# Patient Record
Sex: FEMALE | Race: OTHER | Employment: FULL TIME | URBAN - NONMETROPOLITAN AREA
[De-identification: names, ages, dates, MRNs, and addresses within clinical notes are randomized per-mention and may not be internally consistent; named-entity substitution may affect disease eponyms.]

---

## 2017-11-24 PROBLEM — Z00.00 ANNUAL PHYSICAL EXAM: Status: ACTIVE | Noted: 2017-11-24

## 2017-11-24 PROBLEM — I10 ESSENTIAL HYPERTENSION: Status: ACTIVE | Noted: 2017-11-24

## 2017-11-24 PROBLEM — E78.00 PURE HYPERCHOLESTEROLEMIA: Status: ACTIVE | Noted: 2017-11-24

## 2017-12-05 DIAGNOSIS — I10 ESSENTIAL HYPERTENSION: Primary | ICD-10-CM

## 2017-12-05 DIAGNOSIS — Z00.00 ANNUAL PHYSICAL EXAM: ICD-10-CM

## 2017-12-05 DIAGNOSIS — E78.00 PURE HYPERCHOLESTEROLEMIA: ICD-10-CM

## 2017-12-26 DIAGNOSIS — E78.00 PURE HYPERCHOLESTEROLEMIA: ICD-10-CM

## 2017-12-26 DIAGNOSIS — I10 ESSENTIAL HYPERTENSION: ICD-10-CM

## 2017-12-26 DIAGNOSIS — Z00.00 ANNUAL PHYSICAL EXAM: ICD-10-CM

## 2017-12-26 LAB
ALBUMIN SERPL-MCNC: 4.1 G/DL (ref 3.5–5.2)
ALP BLD-CCNC: 63 U/L (ref 35–104)
ALT SERPL-CCNC: 13 U/L (ref 5–33)
ANION GAP SERPL CALCULATED.3IONS-SCNC: 14 MMOL/L (ref 7–19)
AST SERPL-CCNC: 15 U/L (ref 5–32)
BACTERIA: ABNORMAL /HPF
BILIRUB SERPL-MCNC: 0.5 MG/DL (ref 0.2–1.2)
BILIRUBIN URINE: NEGATIVE
BLOOD, URINE: NEGATIVE
BUN BLDV-MCNC: 12 MG/DL (ref 6–20)
CALCIUM SERPL-MCNC: 8.8 MG/DL (ref 8.6–10)
CHLORIDE BLD-SCNC: 103 MMOL/L (ref 98–111)
CHOLESTEROL, TOTAL: 193 MG/DL (ref 160–199)
CLARITY: ABNORMAL
CO2: 25 MMOL/L (ref 22–29)
COLOR: YELLOW
CREAT SERPL-MCNC: 0.8 MG/DL (ref 0.5–0.9)
EPITHELIAL CELLS, UA: 6 /HPF (ref 0–5)
GFR NON-AFRICAN AMERICAN: >60
GLUCOSE BLD-MCNC: 91 MG/DL (ref 74–109)
GLUCOSE URINE: NEGATIVE MG/DL
HCT VFR BLD CALC: 40.9 % (ref 37–47)
HDLC SERPL-MCNC: 67 MG/DL (ref 65–121)
HEMOGLOBIN: 13.6 G/DL (ref 12–16)
HYALINE CASTS: 0 /HPF (ref 0–8)
KETONES, URINE: NEGATIVE MG/DL
LDL CHOLESTEROL CALCULATED: 108 MG/DL
LEUKOCYTE ESTERASE, URINE: ABNORMAL
MCH RBC QN AUTO: 29.5 PG (ref 27–31)
MCHC RBC AUTO-ENTMCNC: 33.3 G/DL (ref 33–37)
MCV RBC AUTO: 88.7 FL (ref 81–99)
NITRITE, URINE: NEGATIVE
PDW BLD-RTO: 11.9 % (ref 11.5–14.5)
PH UA: 7
PLATELET # BLD: 250 K/UL (ref 130–400)
PMV BLD AUTO: 9.8 FL (ref 9.4–12.3)
POTASSIUM SERPL-SCNC: 4.1 MMOL/L (ref 3.5–5)
PROTEIN UA: NEGATIVE MG/DL
RBC # BLD: 4.61 M/UL (ref 4.2–5.4)
RBC UA: 1 /HPF (ref 0–4)
SODIUM BLD-SCNC: 142 MMOL/L (ref 136–145)
SPECIFIC GRAVITY UA: 1.02
TOTAL PROTEIN: 7 G/DL (ref 6.6–8.7)
TRIGL SERPL-MCNC: 92 MG/DL (ref 0–149)
TSH SERPL DL<=0.05 MIU/L-ACNC: 1.12 UIU/ML (ref 0.27–4.2)
UROBILINOGEN, URINE: 0.2 E.U./DL
WBC # BLD: 7.5 K/UL (ref 4.8–10.8)
WBC UA: 6 /HPF (ref 0–5)

## 2017-12-28 LAB — URINE CULTURE, ROUTINE: NORMAL

## 2017-12-28 RX ORDER — METHYLDOPA 500 MG/1
1 TABLET, FILM COATED ORAL 2 TIMES DAILY
COMMUNITY
Start: 2017-12-21 | End: 2018-01-02 | Stop reason: SDUPTHER

## 2018-01-02 ENCOUNTER — OFFICE VISIT (OUTPATIENT)
Dept: INTERNAL MEDICINE | Age: 43
End: 2018-01-02
Payer: COMMERCIAL

## 2018-01-02 VITALS
OXYGEN SATURATION: 99 % | HEART RATE: 77 BPM | DIASTOLIC BLOOD PRESSURE: 100 MMHG | HEIGHT: 66 IN | SYSTOLIC BLOOD PRESSURE: 148 MMHG | WEIGHT: 215.4 LBS | BODY MASS INDEX: 34.62 KG/M2

## 2018-01-02 DIAGNOSIS — E78.00 PURE HYPERCHOLESTEROLEMIA: ICD-10-CM

## 2018-01-02 DIAGNOSIS — I10 ESSENTIAL HYPERTENSION: ICD-10-CM

## 2018-01-02 DIAGNOSIS — Z00.00 ANNUAL PHYSICAL EXAM: Primary | ICD-10-CM

## 2018-01-02 PROCEDURE — 99396 PREV VISIT EST AGE 40-64: CPT | Performed by: INTERNAL MEDICINE

## 2018-01-02 RX ORDER — METHYLDOPA 500 MG/1
500 TABLET, FILM COATED ORAL 2 TIMES DAILY
Qty: 90 TABLET | Refills: 3 | Status: SHIPPED | OUTPATIENT
Start: 2018-01-02 | End: 2018-06-28 | Stop reason: SDUPTHER

## 2018-01-02 ASSESSMENT — ENCOUNTER SYMPTOMS
CONSTIPATION: 0
TROUBLE SWALLOWING: 0
VOMITING: 0
VOICE CHANGE: 0
COLOR CHANGE: 0
SINUS PRESSURE: 0
CHEST TIGHTNESS: 0
EYE PAIN: 0
EYE REDNESS: 0
WHEEZING: 0
SORE THROAT: 0
ABDOMINAL PAIN: 0
NAUSEA: 0
COUGH: 0
DIARRHEA: 0
BLOOD IN STOOL: 0

## 2018-04-12 PROBLEM — Z00.00 ANNUAL PHYSICAL EXAM: Status: RESOLVED | Noted: 2017-11-24 | Resolved: 2018-04-12

## 2018-06-28 RX ORDER — METHYLDOPA 500 MG/1
TABLET, FILM COATED ORAL
Qty: 60 TABLET | Refills: 3 | Status: SHIPPED | OUTPATIENT
Start: 2018-06-28 | End: 2018-12-01 | Stop reason: SDUPTHER

## 2018-12-03 RX ORDER — METHYLDOPA 500 MG/1
TABLET, FILM COATED ORAL
Qty: 60 TABLET | Refills: 0 | Status: SHIPPED | OUTPATIENT
Start: 2018-12-03 | End: 2019-01-03 | Stop reason: SDUPTHER

## 2019-01-03 RX ORDER — METHYLDOPA 500 MG/1
TABLET, FILM COATED ORAL
Qty: 60 TABLET | Refills: 0 | Status: SHIPPED | OUTPATIENT
Start: 2019-01-03 | End: 2019-01-29 | Stop reason: SDUPTHER

## 2019-01-22 DIAGNOSIS — Z00.00 ANNUAL PHYSICAL EXAM: ICD-10-CM

## 2019-01-22 DIAGNOSIS — I10 ESSENTIAL HYPERTENSION: ICD-10-CM

## 2019-01-22 DIAGNOSIS — E78.00 PURE HYPERCHOLESTEROLEMIA: ICD-10-CM

## 2019-01-22 LAB
ALBUMIN SERPL-MCNC: 4.3 G/DL (ref 3.5–5.2)
ALP BLD-CCNC: 50 U/L (ref 35–104)
ALT SERPL-CCNC: 10 U/L (ref 5–33)
ANION GAP SERPL CALCULATED.3IONS-SCNC: 14 MMOL/L (ref 7–19)
AST SERPL-CCNC: 12 U/L (ref 5–32)
BASOPHILS ABSOLUTE: 0 K/UL (ref 0–0.2)
BASOPHILS RELATIVE PERCENT: 0.5 % (ref 0–1)
BILIRUB SERPL-MCNC: 0.5 MG/DL (ref 0.2–1.2)
BILIRUBIN URINE: NEGATIVE
BLOOD, URINE: NEGATIVE
BUN BLDV-MCNC: 11 MG/DL (ref 6–20)
CALCIUM SERPL-MCNC: 9.2 MG/DL (ref 8.6–10)
CHLORIDE BLD-SCNC: 104 MMOL/L (ref 98–111)
CHOLESTEROL, TOTAL: 194 MG/DL (ref 160–199)
CLARITY: CLEAR
CO2: 25 MMOL/L (ref 22–29)
COLOR: YELLOW
CREAT SERPL-MCNC: 0.9 MG/DL (ref 0.5–0.9)
EOSINOPHILS ABSOLUTE: 0.2 K/UL (ref 0–0.6)
EOSINOPHILS RELATIVE PERCENT: 2.2 % (ref 0–5)
GFR NON-AFRICAN AMERICAN: >60
GLUCOSE BLD-MCNC: 94 MG/DL (ref 74–109)
GLUCOSE URINE: NEGATIVE MG/DL
HCT VFR BLD CALC: 41.1 % (ref 37–47)
HDLC SERPL-MCNC: 59 MG/DL (ref 65–121)
HEMOGLOBIN: 13.5 G/DL (ref 12–16)
KETONES, URINE: ABNORMAL MG/DL
LDL CHOLESTEROL CALCULATED: 106 MG/DL
LEUKOCYTE ESTERASE, URINE: NEGATIVE
LYMPHOCYTES ABSOLUTE: 2.4 K/UL (ref 1.1–4.5)
LYMPHOCYTES RELATIVE PERCENT: 31.8 % (ref 20–40)
MCH RBC QN AUTO: 29.3 PG (ref 27–31)
MCHC RBC AUTO-ENTMCNC: 32.8 G/DL (ref 33–37)
MCV RBC AUTO: 89.2 FL (ref 81–99)
MONOCYTES ABSOLUTE: 0.5 K/UL (ref 0–0.9)
MONOCYTES RELATIVE PERCENT: 6.5 % (ref 0–10)
NEUTROPHILS ABSOLUTE: 4.5 K/UL (ref 1.5–7.5)
NEUTROPHILS RELATIVE PERCENT: 58.7 % (ref 50–65)
NITRITE, URINE: NEGATIVE
PDW BLD-RTO: 11.9 % (ref 11.5–14.5)
PH UA: 7.5
PLATELET # BLD: 278 K/UL (ref 130–400)
PMV BLD AUTO: 9.9 FL (ref 9.4–12.3)
POTASSIUM SERPL-SCNC: 4 MMOL/L (ref 3.5–5)
PROTEIN UA: ABNORMAL MG/DL
RBC # BLD: 4.61 M/UL (ref 4.2–5.4)
SODIUM BLD-SCNC: 143 MMOL/L (ref 136–145)
SPECIFIC GRAVITY UA: 1.02
TOTAL PROTEIN: 7.1 G/DL (ref 6.6–8.7)
TRIGL SERPL-MCNC: 147 MG/DL (ref 0–149)
TSH SERPL DL<=0.05 MIU/L-ACNC: 1.28 UIU/ML (ref 0.27–4.2)
UROBILINOGEN, URINE: 0.2 E.U./DL
WBC # BLD: 7.6 K/UL (ref 4.8–10.8)

## 2019-01-29 ENCOUNTER — OFFICE VISIT (OUTPATIENT)
Dept: INTERNAL MEDICINE | Age: 44
End: 2019-01-29
Payer: COMMERCIAL

## 2019-01-29 ENCOUNTER — HOSPITAL ENCOUNTER (OUTPATIENT)
Age: 44
Setting detail: SPECIMEN
Discharge: HOME | End: 2019-01-29
Payer: COMMERCIAL

## 2019-01-29 VITALS
HEART RATE: 90 BPM | HEIGHT: 66 IN | DIASTOLIC BLOOD PRESSURE: 100 MMHG | SYSTOLIC BLOOD PRESSURE: 158 MMHG | BODY MASS INDEX: 34.07 KG/M2 | WEIGHT: 212 LBS | OXYGEN SATURATION: 98 % | RESPIRATION RATE: 18 BRPM

## 2019-01-29 DIAGNOSIS — I10 ESSENTIAL HYPERTENSION: ICD-10-CM

## 2019-01-29 DIAGNOSIS — Z12.4 SCREENING FOR CERVICAL CANCER: ICD-10-CM

## 2019-01-29 DIAGNOSIS — E78.00 PURE HYPERCHOLESTEROLEMIA: ICD-10-CM

## 2019-01-29 DIAGNOSIS — Z12.39 SCREENING FOR BREAST CANCER: ICD-10-CM

## 2019-01-29 DIAGNOSIS — Z00.00 ANNUAL PHYSICAL EXAM: Primary | ICD-10-CM

## 2019-01-29 PROCEDURE — 88142 CYTOPATH C/V THIN LAYER: CPT

## 2019-01-29 PROCEDURE — 99396 PREV VISIT EST AGE 40-64: CPT | Performed by: INTERNAL MEDICINE

## 2019-01-29 RX ORDER — METHYLDOPA 500 MG/1
TABLET, FILM COATED ORAL
Qty: 60 TABLET | Refills: 11 | Status: SHIPPED | OUTPATIENT
Start: 2019-01-29 | End: 2019-05-28

## 2019-01-29 ASSESSMENT — ENCOUNTER SYMPTOMS
CHEST TIGHTNESS: 0
VOMITING: 0
EYE REDNESS: 0
NAUSEA: 0
CONSTIPATION: 0
WHEEZING: 0
COUGH: 0
SORE THROAT: 0
COLOR CHANGE: 0
BLOOD IN STOOL: 0
VOICE CHANGE: 0
ABDOMINAL PAIN: 0
TROUBLE SWALLOWING: 0
SINUS PRESSURE: 0
EYE PAIN: 0
DIARRHEA: 0

## 2019-01-29 ASSESSMENT — PATIENT HEALTH QUESTIONNAIRE - PHQ9
SUM OF ALL RESPONSES TO PHQ QUESTIONS 1-9: 0
2. FEELING DOWN, DEPRESSED OR HOPELESS: 0
1. LITTLE INTEREST OR PLEASURE IN DOING THINGS: 0
SUM OF ALL RESPONSES TO PHQ9 QUESTIONS 1 & 2: 0
SUM OF ALL RESPONSES TO PHQ QUESTIONS 1-9: 0

## 2019-01-31 ENCOUNTER — TELEPHONE (OUTPATIENT)
Dept: INTERNAL MEDICINE | Age: 44
End: 2019-01-31

## 2019-03-26 ENCOUNTER — APPOINTMENT (OUTPATIENT)
Dept: PREADMISSION TESTING | Facility: HOSPITAL | Age: 44
End: 2019-03-26

## 2019-03-26 ENCOUNTER — HOSPITAL ENCOUNTER (OUTPATIENT)
Dept: GENERAL RADIOLOGY | Facility: HOSPITAL | Age: 44
Discharge: HOME | End: 2019-03-26
Admitting: OBSTETRICS & GYNECOLOGY

## 2019-03-26 VITALS
OXYGEN SATURATION: 98 % | DIASTOLIC BLOOD PRESSURE: 103 MMHG | HEIGHT: 66 IN | SYSTOLIC BLOOD PRESSURE: 200 MMHG | BODY MASS INDEX: 33.73 KG/M2 | WEIGHT: 209.88 LBS | HEART RATE: 66 BPM

## 2019-03-26 LAB
ALBUMIN SERPL-MCNC: 4.6 G/DL (ref 3.5–5)
ALBUMIN/GLOB SERPL: 1.6 G/DL (ref 1.1–2.5)
ALP SERPL-CCNC: 53 U/L (ref 24–120)
ALT SERPL W P-5'-P-CCNC: 17 U/L (ref 0–54)
ANION GAP SERPL CALCULATED.3IONS-SCNC: 8 MMOL/L (ref 4–13)
AST SERPL-CCNC: 26 U/L (ref 7–45)
BASOPHILS # BLD AUTO: 0.04 10*3/MM3 (ref 0–0.2)
BASOPHILS NFR BLD AUTO: 0.6 % (ref 0–2)
BILIRUB SERPL-MCNC: 0.4 MG/DL (ref 0.1–1)
BUN BLD-MCNC: 15 MG/DL (ref 5–21)
BUN/CREAT SERPL: 16.7 (ref 7–25)
CALCIUM SPEC-SCNC: 9.1 MG/DL (ref 8.4–10.4)
CHLORIDE SERPL-SCNC: 103 MMOL/L (ref 98–110)
CO2 SERPL-SCNC: 29 MMOL/L (ref 24–31)
CREAT BLD-MCNC: 0.9 MG/DL (ref 0.5–1.4)
DEPRECATED RDW RBC AUTO: 37.5 FL (ref 40–54)
EOSINOPHIL # BLD AUTO: 0.2 10*3/MM3 (ref 0–0.7)
EOSINOPHIL NFR BLD AUTO: 3.1 % (ref 0–4)
ERYTHROCYTE [DISTWIDTH] IN BLOOD BY AUTOMATED COUNT: 11.8 % (ref 12–15)
GFR SERPL CREATININE-BSD FRML MDRD: 68 ML/MIN/1.73
GLOBULIN UR ELPH-MCNC: 2.8 GM/DL
GLUCOSE BLD-MCNC: 81 MG/DL (ref 70–100)
HCG SERPL QL: NEGATIVE
HCT VFR BLD AUTO: 39 % (ref 37–47)
HGB BLD-MCNC: 13.3 G/DL (ref 12–16)
IMM GRANULOCYTES # BLD AUTO: 0.02 10*3/MM3 (ref 0–0.05)
IMM GRANULOCYTES NFR BLD AUTO: 0.3 % (ref 0–5)
LYMPHOCYTES # BLD AUTO: 2.21 10*3/MM3 (ref 0.72–4.86)
LYMPHOCYTES NFR BLD AUTO: 34.3 % (ref 15–45)
MCH RBC QN AUTO: 29.8 PG (ref 28–32)
MCHC RBC AUTO-ENTMCNC: 34.1 G/DL (ref 33–36)
MCV RBC AUTO: 87.2 FL (ref 82–98)
MONOCYTES # BLD AUTO: 0.27 10*3/MM3 (ref 0.19–1.3)
MONOCYTES NFR BLD AUTO: 4.2 % (ref 4–12)
NEUTROPHILS # BLD AUTO: 3.71 10*3/MM3 (ref 1.87–8.4)
NEUTROPHILS NFR BLD AUTO: 57.5 % (ref 39–78)
NRBC BLD AUTO-RTO: 0 /100 WBC (ref 0–0)
PLATELET # BLD AUTO: 318 10*3/MM3 (ref 130–400)
PMV BLD AUTO: 9.6 FL (ref 6–12)
POTASSIUM BLD-SCNC: 4 MMOL/L (ref 3.5–5.3)
PROT SERPL-MCNC: 7.4 G/DL (ref 6.3–8.7)
RBC # BLD AUTO: 4.47 10*6/MM3 (ref 4.2–5.4)
SODIUM BLD-SCNC: 140 MMOL/L (ref 135–145)
WBC NRBC COR # BLD: 6.45 10*3/MM3 (ref 4.8–10.8)

## 2019-03-26 PROCEDURE — 71046 X-RAY EXAM CHEST 2 VIEWS: CPT

## 2019-03-26 PROCEDURE — 36415 COLL VENOUS BLD VENIPUNCTURE: CPT

## 2019-03-26 PROCEDURE — 93005 ELECTROCARDIOGRAM TRACING: CPT

## 2019-03-26 PROCEDURE — 84703 CHORIONIC GONADOTROPIN ASSAY: CPT | Performed by: OBSTETRICS & GYNECOLOGY

## 2019-03-26 PROCEDURE — 80053 COMPREHEN METABOLIC PANEL: CPT | Performed by: OBSTETRICS & GYNECOLOGY

## 2019-03-26 PROCEDURE — 93010 ELECTROCARDIOGRAM REPORT: CPT | Performed by: INTERNAL MEDICINE

## 2019-03-26 PROCEDURE — 85025 COMPLETE CBC W/AUTO DIFF WBC: CPT | Performed by: OBSTETRICS & GYNECOLOGY

## 2019-03-26 RX ORDER — METHYLDOPA 500 MG/1
500 TABLET, FILM COATED ORAL 2 TIMES DAILY
COMMUNITY
End: 2023-02-22

## 2019-03-26 NOTE — DISCHARGE INSTRUCTIONS
DAY OF SURGERY INSTRUCTIONS        YOUR SURGEON: ***    PROCEDURE: ***loop electrocautery excision procedure    DATE OF SURGERY: ***4/2/19    ARRIVAL TIME: AS DIRECTED BY OFFICE    YOU MAY TAKE THE FOLLOWING MEDICATION(S) THE MORNING OF SURGERY WITH A SIP OF WATER: ***check with your doctor                  MANAGING PAIN AFTER SURGERY    We know you are probably wondering what your pain will be like after surgery.  Following surgery it is unrealistic to expect you will not have pain.   Pain is how our bodies let us know that something is wrong or cautions us to be careful.  That said, our goal is to make your pain tolerable.    Methods we may use to treat your pain include (oral or IV medications, PCAs, epidurals, nerve blocks, etc.)   While some procedures require IV pain medications for a short time after surgery, transitioning to pain medications by mouth allows for better management of pain.   Your nurse will encourage you to take oral pain medications whenever possible.  IV medications work almost immediately, but only last a short while.  Taking medications by mouth allows for a more constant level of medication in your blood stream for a longer period of time.      Once your pain is out of control it is harder to get back under control.  It is important you are aware when your next dose of pain medication is due.  If you are admitted, your nurse may write the time of your next dose on the white board in your room to help you remember.      We are interested in your pain and encourage you to inform us about aggravating factors during your visit.   Many times a simple repositioning every few hours can make a big difference.    If your physician says it is okay, do not let your pain prevent you from getting out of bed. Be sure to call your nurse for assistance prior to getting up so you do not fall.      Before surgery, please decide your tolerable pain goal.  These faces help describe the pain  ratings we use on a 0-10 scale.   Be prepared to tell us your goal and whether or not you take pain or anxiety medications at home.            BEFORE YOU COME TO THE HOSPITAL  (Pre-op instructions)  • Do not eat, drink, smoke or chew gum after midnight the night before surgery.  This also includes no mints.  • Morning of surgery take only the medicines you have been instructed with a sip of water unless otherwise instructed  by your physician.  • Do not shave, wear makeup or dark nail polish.  • Remove all jewelry including rings.  • Leave anything you consider valuable at home.  • Leave your suitcase in the car until after your surgery.  • Bring the following with you if applicable:  o Picture ID and insurance, Medicare or Medicaid cards  o Co-pay/deductible required by insurance (cash, check, credit card)  o Copy of advance directive, living will or power-of- documents if not brought to PAT  o CPAP or BIPAP mask and tubing  o Relaxation aids (MP3 player, book, magazine)  • On the day of surgery check in at registration located at the main entrance of the hospital.       Outpatient Surgery Guidelines, Adult  Outpatient procedures are those for which the person having the procedure is allowed to go home the same day as the procedure. Various procedures are done on an outpatient basis. You should follow some general guidelines if you will be having an outpatient procedure.  LET YOUR HEALTH CARE PROVIDER KNOW ABOUT:  · Any allergies you have.  · All medicines you are taking, including vitamins, herbs, eye drops, creams, and over-the-counter medicines.  · Previous problems you or members of your family have had with the use of anesthetics.  · Any blood disorders you have.  · Previous surgeries you have had.  · Medical conditions you have.  RISKS AND COMPLICATIONS  Your health care provider will discuss possible risks and complications with you before surgery. Common risks and complications include:     · Problems due to the use of anesthetics.  · Blood loss and replacement (does not apply to minor surgical procedures).  · Temporary increase in pain due to surgery.  · Uncorrected pain or problems that the surgery was meant to correct.  · Infection.  · New damage.  BEFORE THE PROCEDURE  · Ask your health care provider about changing or stopping your regular medicines. You may need to stop taking certain medicines in the days or weeks before the procedure.  · Stop smoking at least 2 weeks before surgery. This lowers your risk for complications during and after surgery. Ask your health care provider for help with this if needed.  · Eat your usual meals and a light supper the day before surgery. Continue fluid intake. Do not drink alcohol.  · Do not eat or drink after midnight the night before your surgery.   · Arrange for someone to take you home and to stay with you for 24 hours after the procedure. Medicine given for your procedure may affect your ability to drive or to care for yourself.  · Call your health care provider's office if you develop an illness or problem that may prevent you from safely having your procedure.  AFTER THE PROCEDURE  After surgery, you will be taken to a recovery area, where your progress will be monitored. If there are no complications, you will be allowed to go home when you are awake, stable, and taking fluids well. You may have numbness around the surgical site. Healing will take some time. You will have tenderness at the surgical site and may have some swelling and bruising. You may also have some nausea.  HOME CARE INSTRUCTIONS  · Do not drive for 24 hours, or as directed by your health care provider. Do not drive while taking prescription pain medicines.  · Do not drink alcohol for 24 hours.  · Do not make important decisions or sign legal documents for 24 hours.  · You may resume a normal diet and activities as directed.  · Do not lift anything heavier than 10 pounds (4.5 kg) or  play contact sports until your health care provider says it is okay.  · Change your bandages (dressings) as directed.  · Only take over-the-counter or prescription medicines as directed by your health care provider.  · Follow up with your health care provider as directed.  SEEK MEDICAL CARE IF:  · You have increased bleeding (more than a small spot) from the surgical site.  · You have redness, swelling, or increasing pain in the wound.  · You see pus coming from the wound.  · You have a fever.  · You notice a bad smell coming from the wound or dressing.  · You feel lightheaded or faint.  · You develop a rash.  · You have trouble breathing.  · You develop allergies.  MAKE SURE YOU:  · Understand these instructions.  · Will watch your condition.  · Will get help right away if you are not doing well or get worse.     This information is not intended to replace advice given to you by your health care provider. Make sure you discuss any questions you have with your health care provider.     Document Released: 09/12/2002 Document Revised: 05/03/2016 Document Reviewed: 05/22/2014  [employer] Interactive Patient Education ©2016 [employer]       Fall Prevention in Hospitals, Adult  As a hospital patient, your condition and the treatments you receive can increase your risk for falls. Some additional risk factors for falls in a hospital include:  · Being in an unfamiliar environment.  · Being on bed rest.  · Your surgery.  · Taking certain medicines.  · Your tubing requirements, such as intravenous (IV) therapy or catheters.  It is important that you learn how to decrease fall risks while at the hospital. Below are important tips that can help prevent falls.  SAFETY TIPS FOR PREVENTING FALLS  Talk about your risk of falling.  · Ask your health care provider why you are at risk for falling. Is it your medicine, illness, tubing placement, or something else?  · Make a plan with your health care provider to keep you safe from  falls.  · Ask your health care provider or pharmacist about side effects of your medicines. Some medicines can make you dizzy or affect your coordination.  Ask for help.  · Ask for help before getting out of bed. You may need to press your call button.  · Ask for assistance in getting safely to the toilet.  · Ask for a walker or cane to be put at your bedside. Ask that most of the side rails on your bed be placed up before your health care provider leaves the room.  · Ask family or friends to sit with you.  · Ask for things that are out of your reach, such as your glasses, hearing aids, telephone, bedside table, or call button.  Follow these tips to avoid falling:  · Stay lying or seated, rather than standing, while waiting for help.  · Wear rubber-soled slippers or shoes whenever you walk in the hospital.  · Avoid quick, sudden movements.  ¨ Change positions slowly.  ¨ Sit on the side of your bed before standing.  ¨ Stand up slowly and wait before you start to walk.  · Let your health care provider know if there is a spill on the floor.  · Pay careful attention to the medical equipment, electrical cords, and tubes around you.  · When you need help, use your call button by your bed or in the bathroom. Wait for one of your health care providers to help you.  · If you feel dizzy or unsure of your footing, return to bed and wait for assistance.  · Avoid being distracted by the TV, telephone, or another person in your room.  · Do not lean or support yourself on rolling objects, such as IV poles or bedside tables.     This information is not intended to replace advice given to you by your health care provider. Make sure you discuss any questions you have with your health care provider.     Document Released: 12/15/2001 Document Revised: 01/08/2016 Document Reviewed: 08/25/2013  [employer] Interactive Patient Education ©2016 [employer]       Surgical Site Infections FAQs  What is a Surgical Site Infection (SSI)?  A  surgical site infection is an infection that occurs after surgery in the part of the body where the surgery took place. Most patients who have surgery do not develop an infection. However, infections develop in about 1 to 3 out of every 100 patients who have surgery.  Some of the common symptoms of a surgical site infection are:  · Redness and pain around the area where you had surgery  · Drainage of cloudy fluid from your surgical wound  · Fever  Can SSIs be treated?  Yes. Most surgical site infections can be treated with antibiotics. The antibiotic given to you depends on the bacteria (germs) causing the infection. Sometimes patients with SSIs also need another surgery to treat the infection.  What are some of the things that hospitals are doing to prevent SSIs?  To prevent SSIs, doctors, nurses, and other healthcare providers:  · Clean their hands and arms up to their elbows with an antiseptic agent just before the surgery.  · Clean their hands with soap and water or an alcohol-based hand rub before and after caring for each patient.  · May remove some of your hair immediately before your surgery using electric clippers if the hair is in the same area where the procedure will occur. They should not shave you with a razor.  · Wear special hair covers, masks, gowns, and gloves during surgery to keep the surgery area clean.  · Give you antibiotics before your surgery starts. In most cases, you should get antibiotics within 60 minutes before the surgery starts and the antibiotics should be stopped within 24 hours after surgery.  · Clean the skin at the site of your surgery with a special soap that kills germs.  What can I do to help prevent SSIs?  Before your surgery:  · Tell your doctor about other medical problems you may have. Health problems such as allergies, diabetes, and obesity could affect your surgery and your treatment.  · Quit smoking. Patients who smoke get more infections. Talk to your doctor about how  you can quit before your surgery.  · Do not shave near where you will have surgery. Shaving with a razor can irritate your skin and make it easier to develop an infection.  At the time of your surgery:  · Speak up if someone tries to shave you with a razor before surgery. Ask why you need to be shaved and talk with your surgeon if you have any concerns.  · Ask if you will get antibiotics before surgery.  After your surgery:  · Make sure that your healthcare providers clean their hands before examining you, either with soap and water or an alcohol-based hand rub.  · If you do not see your providers clean their hands, please ask them to do so.  · Family and friends who visit you should not touch the surgical wound or dressings.  · Family and friends should clean their hands with soap and water or an alcohol-based hand rub before and after visiting you. If you do not see them clean their hands, ask them to clean their hands.  What do I need to do when I go home from the hospital?  · Before you go home, your doctor or nurse should explain everything you need to know about taking care of your wound. Make sure you understand how to care for your wound before you leave the hospital.  · Always clean your hands before and after caring for your wound.  · Before you go home, make sure you know who to contact if you have questions or problems after you get home.  · If you have any symptoms of an infection, such as redness and pain at the surgery site, drainage, or fever, call your doctor immediately.  If you have additional questions, please ask your doctor or nurse.  Developed and co-sponsored by The Society for Healthcare Epidemiology of [place] (SHEA); Infectious Diseases Society of [place] (IDSA); American Hospital Association; Association for Professionals in Infection Control and Epidemiology (APIC); Centers for Disease Control and Prevention (CDC); and The Joint Commission.     This information is not intended to replace  advice given to you by your health care provider. Make sure you discuss any questions you have with your health care provider.     Document Released: 12/23/2014 Document Revised: 01/08/2016 Document Reviewed: 03/02/2016  [employer] Interactive Patient Education ©2016 [employer]     PATIENT/FAMILY/RESPONSIBLE PARTY VERBALIZES UNDERSTANDING OF ABOVE EDUCATION.  COPY OF PAIN SCALE GIVEN AND REVIEWED WITH VERBALIZED UNDERSTANDING.

## 2019-03-27 NOTE — H&P
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  Colposcopic biopsy and ECC with no evidence of invasion.    Review of Systems   Constitutional: Negative.    HENT: Negative.    Eyes: Negative.    Respiratory: Negative.    Cardiovascular: Negative.    Gastrointestinal: Negative.    Endocrine: Negative.    Genitourinary: Negative.    Musculoskeletal: Negative.    Skin: Negative.    Allergic/Immunologic: Negative.    Neurological: Negative.    Hematological: Negative.    Psychiatric/Behavioral: Negative.         Past Medical History:   Diagnosis Date   • Allergy-induced asthma    • Hypertension      No past surgical history on file.  Family History   Problem Relation Age of Onset   • Diabetes Paternal Grandmother      Social History     Tobacco Use   • Smoking status: Never Smoker   • Smokeless tobacco: Never Used   Substance Use Topics   • Alcohol use: No   • Drug use: No     Ob History:      Medications:  Methyldopa and prenatal vitamins    Allergies:  Latex    Objective      Vital Signs  BP:  150/90  P:  98  R:  15  Ht:  66\"  Wt:  209       Physical Exam   Constitutional: She is oriented to person, place, and time. She appears well-developed and well-nourished.   HENT:   Head: Atraumatic.   Eyes: EOM are normal.   Neck: Normal range of motion. Neck supple.   Cardiovascular: Normal rate, regular rhythm, normal heart sounds and intact distal pulses.   Pulmonary/Chest: Effort normal and breath sounds normal.   Abdominal: Soft. Bowel sounds are normal.   Genitourinary: Vagina normal and uterus normal.   Musculoskeletal: Normal range of motion.   Neurological: She is alert and oriented to person, place, and time.   Skin: Skin is warm.   Psychiatric: She has a normal mood and affect. Her behavior is normal. Judgment and thought content normal. "         Assessment & Plan     Assessment:  1.  HGSIL    Plan:  1.  LEEP    I discussed the patients findings and my recommendations with patient    Ashley Kilpatrick DO  03/27/19  11:03 AM

## 2019-04-02 ENCOUNTER — ANESTHESIA (OUTPATIENT)
Dept: PERIOP | Facility: HOSPITAL | Age: 44
End: 2019-04-02

## 2019-04-02 ENCOUNTER — ANESTHESIA EVENT (OUTPATIENT)
Dept: PERIOP | Facility: HOSPITAL | Age: 44
End: 2019-04-02

## 2019-04-02 ENCOUNTER — HOSPITAL ENCOUNTER (OUTPATIENT)
Facility: HOSPITAL | Age: 44
Setting detail: HOSPITAL OUTPATIENT SURGERY
Discharge: HOME | End: 2019-04-02
Attending: OBSTETRICS & GYNECOLOGY | Admitting: OBSTETRICS & GYNECOLOGY

## 2019-04-02 VITALS
SYSTOLIC BLOOD PRESSURE: 136 MMHG | OXYGEN SATURATION: 98 % | DIASTOLIC BLOOD PRESSURE: 91 MMHG | TEMPERATURE: 97.6 F | HEART RATE: 69 BPM | RESPIRATION RATE: 16 BRPM

## 2019-04-02 DIAGNOSIS — N87.9 DYSPLASIA OF CERVIX: ICD-10-CM

## 2019-04-02 PROBLEM — I10 ESSENTIAL HYPERTENSION: Status: ACTIVE | Noted: 2017-11-24

## 2019-04-02 PROBLEM — E78.00 PURE HYPERCHOLESTEROLEMIA: Status: ACTIVE | Noted: 2017-11-24

## 2019-04-02 LAB — B-HCG UR QL: NEGATIVE

## 2019-04-02 PROCEDURE — 81025 URINE PREGNANCY TEST: CPT | Performed by: OBSTETRICS & GYNECOLOGY

## 2019-04-02 PROCEDURE — 25010000002 DEXAMETHASONE PER 1 MG: Performed by: NURSE ANESTHETIST, CERTIFIED REGISTERED

## 2019-04-02 PROCEDURE — 25010000002 MIDAZOLAM PER 1 MG: Performed by: ANESTHESIOLOGY

## 2019-04-02 PROCEDURE — 25010000002 ONDANSETRON PER 1 MG: Performed by: NURSE ANESTHETIST, CERTIFIED REGISTERED

## 2019-04-02 PROCEDURE — 25010000002 FENTANYL CITRATE (PF) 100 MCG/2ML SOLUTION: Performed by: NURSE ANESTHETIST, CERTIFIED REGISTERED

## 2019-04-02 PROCEDURE — 25010000002 DEXAMETHASONE PER 1 MG: Performed by: ANESTHESIOLOGY

## 2019-04-02 PROCEDURE — 25010000002 PROPOFOL 10 MG/ML EMULSION: Performed by: NURSE ANESTHETIST, CERTIFIED REGISTERED

## 2019-04-02 PROCEDURE — 88307 TISSUE EXAM BY PATHOLOGIST: CPT | Performed by: OBSTETRICS & GYNECOLOGY

## 2019-04-02 PROCEDURE — 25010000002 SUCCINYLCHOLINE PER 20 MG: Performed by: NURSE ANESTHETIST, CERTIFIED REGISTERED

## 2019-04-02 RX ORDER — IBUPROFEN 600 MG/1
600 TABLET ORAL ONCE AS NEEDED
Status: DISCONTINUED | OUTPATIENT
Start: 2019-04-02 | End: 2019-04-02 | Stop reason: SDUPTHER

## 2019-04-02 RX ORDER — FENTANYL CITRATE 50 UG/ML
INJECTION, SOLUTION INTRAMUSCULAR; INTRAVENOUS AS NEEDED
Status: DISCONTINUED | OUTPATIENT
Start: 2019-04-02 | End: 2019-04-02 | Stop reason: SURG

## 2019-04-02 RX ORDER — MEPERIDINE HYDROCHLORIDE 25 MG/ML
12.5 INJECTION INTRAMUSCULAR; INTRAVENOUS; SUBCUTANEOUS
Status: DISCONTINUED | OUTPATIENT
Start: 2019-04-02 | End: 2019-04-02 | Stop reason: HOSPADM

## 2019-04-02 RX ORDER — OXYCODONE AND ACETAMINOPHEN 10; 325 MG/1; MG/1
1 TABLET ORAL ONCE AS NEEDED
Status: DISCONTINUED | OUTPATIENT
Start: 2019-04-02 | End: 2019-04-02 | Stop reason: HOSPADM

## 2019-04-02 RX ORDER — DEXAMETHASONE SODIUM PHOSPHATE 4 MG/ML
INJECTION, SOLUTION INTRA-ARTICULAR; INTRALESIONAL; INTRAMUSCULAR; INTRAVENOUS; SOFT TISSUE AS NEEDED
Status: DISCONTINUED | OUTPATIENT
Start: 2019-04-02 | End: 2019-04-02 | Stop reason: SURG

## 2019-04-02 RX ORDER — HYDROCODONE BITARTRATE AND ACETAMINOPHEN 5; 325 MG/1; MG/1
1 TABLET ORAL ONCE AS NEEDED
Status: DISCONTINUED | OUTPATIENT
Start: 2019-04-02 | End: 2019-04-02 | Stop reason: SDUPTHER

## 2019-04-02 RX ORDER — IPRATROPIUM BROMIDE AND ALBUTEROL SULFATE 2.5; .5 MG/3ML; MG/3ML
3 SOLUTION RESPIRATORY (INHALATION) ONCE AS NEEDED
Status: DISCONTINUED | OUTPATIENT
Start: 2019-04-02 | End: 2019-04-02 | Stop reason: HOSPADM

## 2019-04-02 RX ORDER — FENTANYL CITRATE 50 UG/ML
25 INJECTION, SOLUTION INTRAMUSCULAR; INTRAVENOUS AS NEEDED
Status: DISCONTINUED | OUTPATIENT
Start: 2019-04-02 | End: 2019-04-02 | Stop reason: HOSPADM

## 2019-04-02 RX ORDER — NALOXONE HCL 0.4 MG/ML
0.4 VIAL (ML) INJECTION AS NEEDED
Status: DISCONTINUED | OUTPATIENT
Start: 2019-04-02 | End: 2019-04-02 | Stop reason: HOSPADM

## 2019-04-02 RX ORDER — OXYCODONE AND ACETAMINOPHEN 7.5; 325 MG/1; MG/1
2 TABLET ORAL EVERY 4 HOURS PRN
Status: DISCONTINUED | OUTPATIENT
Start: 2019-04-02 | End: 2019-04-02 | Stop reason: HOSPADM

## 2019-04-02 RX ORDER — ACETAMINOPHEN 500 MG
1000 TABLET ORAL ONCE
Status: COMPLETED | OUTPATIENT
Start: 2019-04-02 | End: 2019-04-02

## 2019-04-02 RX ORDER — PROPOFOL 10 MG/ML
VIAL (ML) INTRAVENOUS AS NEEDED
Status: DISCONTINUED | OUTPATIENT
Start: 2019-04-02 | End: 2019-04-02 | Stop reason: SURG

## 2019-04-02 RX ORDER — ONDANSETRON 2 MG/ML
INJECTION INTRAMUSCULAR; INTRAVENOUS AS NEEDED
Status: DISCONTINUED | OUTPATIENT
Start: 2019-04-02 | End: 2019-04-02 | Stop reason: SURG

## 2019-04-02 RX ORDER — ONDANSETRON 2 MG/ML
4 INJECTION INTRAMUSCULAR; INTRAVENOUS ONCE AS NEEDED
Status: DISCONTINUED | OUTPATIENT
Start: 2019-04-02 | End: 2019-04-02 | Stop reason: HOSPADM

## 2019-04-02 RX ORDER — PROMETHAZINE HYDROCHLORIDE 25 MG/ML
12.5 INJECTION, SOLUTION INTRAMUSCULAR; INTRAVENOUS ONCE AS NEEDED
Status: DISCONTINUED | OUTPATIENT
Start: 2019-04-02 | End: 2019-04-02 | Stop reason: HOSPADM

## 2019-04-02 RX ORDER — METOCLOPRAMIDE HYDROCHLORIDE 5 MG/ML
5 INJECTION INTRAMUSCULAR; INTRAVENOUS
Status: DISCONTINUED | OUTPATIENT
Start: 2019-04-02 | End: 2019-04-02 | Stop reason: HOSPADM

## 2019-04-02 RX ORDER — ONDANSETRON 4 MG/1
4 TABLET, FILM COATED ORAL ONCE AS NEEDED
Status: DISCONTINUED | OUTPATIENT
Start: 2019-04-02 | End: 2019-04-02 | Stop reason: HOSPADM

## 2019-04-02 RX ORDER — MIDAZOLAM HYDROCHLORIDE 1 MG/ML
1 INJECTION INTRAMUSCULAR; INTRAVENOUS
Status: DISCONTINUED | OUTPATIENT
Start: 2019-04-02 | End: 2019-04-02 | Stop reason: HOSPADM

## 2019-04-02 RX ORDER — MAGNESIUM HYDROXIDE 1200 MG/15ML
LIQUID ORAL AS NEEDED
Status: DISCONTINUED | OUTPATIENT
Start: 2019-04-02 | End: 2019-04-02 | Stop reason: HOSPADM

## 2019-04-02 RX ORDER — MIDAZOLAM HYDROCHLORIDE 1 MG/ML
2 INJECTION INTRAMUSCULAR; INTRAVENOUS
Status: DISCONTINUED | OUTPATIENT
Start: 2019-04-02 | End: 2019-04-02 | Stop reason: HOSPADM

## 2019-04-02 RX ORDER — HYDROCODONE BITARTRATE AND ACETAMINOPHEN 5; 325 MG/1; MG/1
1-2 TABLET ORAL EVERY 6 HOURS PRN
Qty: 20 TABLET | Refills: 0 | Status: SHIPPED | OUTPATIENT
Start: 2019-04-02 | End: 2019-04-06

## 2019-04-02 RX ORDER — SODIUM CHLORIDE 0.9 % (FLUSH) 0.9 %
3 SYRINGE (ML) INJECTION AS NEEDED
Status: DISCONTINUED | OUTPATIENT
Start: 2019-04-02 | End: 2019-04-02 | Stop reason: HOSPADM

## 2019-04-02 RX ORDER — LIDOCAINE HYDROCHLORIDE AND EPINEPHRINE 10; 10 MG/ML; UG/ML
INJECTION, SOLUTION INFILTRATION; PERINEURAL AS NEEDED
Status: DISCONTINUED | OUTPATIENT
Start: 2019-04-02 | End: 2019-04-02 | Stop reason: HOSPADM

## 2019-04-02 RX ORDER — SODIUM CHLORIDE, SODIUM LACTATE, POTASSIUM CHLORIDE, CALCIUM CHLORIDE 600; 310; 30; 20 MG/100ML; MG/100ML; MG/100ML; MG/100ML
1000 INJECTION, SOLUTION INTRAVENOUS CONTINUOUS
Status: DISCONTINUED | OUTPATIENT
Start: 2019-04-02 | End: 2019-04-02 | Stop reason: HOSPADM

## 2019-04-02 RX ORDER — IBUPROFEN 600 MG/1
600 TABLET ORAL EVERY 6 HOURS PRN
Status: DISCONTINUED | OUTPATIENT
Start: 2019-04-02 | End: 2019-04-02 | Stop reason: HOSPADM

## 2019-04-02 RX ORDER — SUCCINYLCHOLINE CHLORIDE 20 MG/ML
INJECTION INTRAMUSCULAR; INTRAVENOUS AS NEEDED
Status: DISCONTINUED | OUTPATIENT
Start: 2019-04-02 | End: 2019-04-02 | Stop reason: SURG

## 2019-04-02 RX ORDER — ONDANSETRON 2 MG/ML
4 INJECTION INTRAMUSCULAR; INTRAVENOUS ONCE AS NEEDED
Status: DISCONTINUED | OUTPATIENT
Start: 2019-04-02 | End: 2019-04-02 | Stop reason: SDUPTHER

## 2019-04-02 RX ORDER — ROCURONIUM BROMIDE 10 MG/ML
INJECTION, SOLUTION INTRAVENOUS AS NEEDED
Status: DISCONTINUED | OUTPATIENT
Start: 2019-04-02 | End: 2019-04-02 | Stop reason: SURG

## 2019-04-02 RX ORDER — LUGOLS 0.4 G/G
LIQUID TOPICAL AS NEEDED
Status: DISCONTINUED | OUTPATIENT
Start: 2019-04-02 | End: 2019-04-02 | Stop reason: HOSPADM

## 2019-04-02 RX ORDER — LABETALOL HYDROCHLORIDE 5 MG/ML
5 INJECTION, SOLUTION INTRAVENOUS
Status: DISCONTINUED | OUTPATIENT
Start: 2019-04-02 | End: 2019-04-02 | Stop reason: HOSPADM

## 2019-04-02 RX ORDER — SODIUM CHLORIDE 0.9 % (FLUSH) 0.9 %
3 SYRINGE (ML) INJECTION EVERY 12 HOURS SCHEDULED
Status: DISCONTINUED | OUTPATIENT
Start: 2019-04-02 | End: 2019-04-02 | Stop reason: HOSPADM

## 2019-04-02 RX ORDER — SODIUM CHLORIDE, SODIUM LACTATE, POTASSIUM CHLORIDE, CALCIUM CHLORIDE 600; 310; 30; 20 MG/100ML; MG/100ML; MG/100ML; MG/100ML
100 INJECTION, SOLUTION INTRAVENOUS CONTINUOUS
Status: DISCONTINUED | OUTPATIENT
Start: 2019-04-02 | End: 2019-04-02 | Stop reason: HOSPADM

## 2019-04-02 RX ORDER — LIDOCAINE HYDROCHLORIDE 20 MG/ML
INJECTION, SOLUTION INFILTRATION; PERINEURAL AS NEEDED
Status: DISCONTINUED | OUTPATIENT
Start: 2019-04-02 | End: 2019-04-02 | Stop reason: SURG

## 2019-04-02 RX ORDER — DEXAMETHASONE SODIUM PHOSPHATE 4 MG/ML
4 INJECTION, SOLUTION INTRA-ARTICULAR; INTRALESIONAL; INTRAMUSCULAR; INTRAVENOUS; SOFT TISSUE ONCE AS NEEDED
Status: COMPLETED | OUTPATIENT
Start: 2019-04-02 | End: 2019-04-02

## 2019-04-02 RX ORDER — SODIUM CHLORIDE 0.9 % (FLUSH) 0.9 %
1-10 SYRINGE (ML) INJECTION AS NEEDED
Status: DISCONTINUED | OUTPATIENT
Start: 2019-04-02 | End: 2019-04-02 | Stop reason: HOSPADM

## 2019-04-02 RX ORDER — LIDOCAINE HYDROCHLORIDE 40 MG/ML
SOLUTION TOPICAL AS NEEDED
Status: DISCONTINUED | OUTPATIENT
Start: 2019-04-02 | End: 2019-04-02 | Stop reason: SURG

## 2019-04-02 RX ADMIN — DEXAMETHASONE SODIUM PHOSPHATE 4 MG: 4 INJECTION, SOLUTION INTRAMUSCULAR; INTRAVENOUS at 08:19

## 2019-04-02 RX ADMIN — ONDANSETRON HYDROCHLORIDE 4 MG: 2 SOLUTION INTRAMUSCULAR; INTRAVENOUS at 08:19

## 2019-04-02 RX ADMIN — SUCCINYLCHOLINE CHLORIDE 140 MG: 20 INJECTION, SOLUTION INTRAMUSCULAR; INTRAVENOUS at 08:06

## 2019-04-02 RX ADMIN — LIDOCAINE HYDROCHLORIDE 80 MG: 20 INJECTION, SOLUTION INFILTRATION; PERINEURAL at 08:06

## 2019-04-02 RX ADMIN — FENTANYL CITRATE 100 MCG: 50 INJECTION, SOLUTION INTRAMUSCULAR; INTRAVENOUS at 08:06

## 2019-04-02 RX ADMIN — MIDAZOLAM 2 MG: 1 INJECTION INTRAMUSCULAR; INTRAVENOUS at 07:21

## 2019-04-02 RX ADMIN — ROCURONIUM BROMIDE 5 MG: 10 INJECTION INTRAVENOUS at 08:06

## 2019-04-02 RX ADMIN — DEXAMETHASONE SODIUM PHOSPHATE 4 MG: 4 INJECTION, SOLUTION INTRAMUSCULAR; INTRAVENOUS at 07:21

## 2019-04-02 RX ADMIN — PROPOFOL 200 MG: 10 INJECTION, EMULSION INTRAVENOUS at 08:06

## 2019-04-02 RX ADMIN — SODIUM CHLORIDE, POTASSIUM CHLORIDE, SODIUM LACTATE AND CALCIUM CHLORIDE 1000 ML: 600; 310; 30; 20 INJECTION, SOLUTION INTRAVENOUS at 06:35

## 2019-04-02 RX ADMIN — ACETAMINOPHEN 1000 MG: 500 TABLET, FILM COATED ORAL at 07:20

## 2019-04-02 RX ADMIN — LIDOCAINE HYDROCHLORIDE 1 EACH: 40 SOLUTION TOPICAL at 08:06

## 2019-04-02 NOTE — DISCHARGE INSTRUCTIONS

## 2019-04-02 NOTE — ANESTHESIA POSTPROCEDURE EVALUATION
Patient: Nicole Berrios    Procedure Summary     Date:  04/02/19 Room / Location:  [facility] OR  /  [facility] OR    Anesthesia Start:  0801 Anesthesia Stop:  0836    Procedure:  LOOP ELECTROCAUTERY EXCISION PROCEDURE (N/A Cervix) Diagnosis:  (HIGH GRADE SQUAMOUS CELL)    Surgeon:  Ashley Kilpatrick DO Provider:  Vinicio Joshi CRNA    Anesthesia Type:  general ASA Status:  2          Anesthesia Type: general  Last vitals  BP   159/88 (04/02/19 0930)   Temp   97.6 °F (36.4 °C) (04/02/19 0838)   Pulse   66 (04/02/19 0930)   Resp   16 (04/02/19 0930)     SpO2   99 % (04/02/19 0930)     Post Anesthesia Care and Evaluation    Patient location during evaluation: PACU  Patient participation: complete - patient participated  Level of consciousness: awake and alert  Pain management: adequate  Airway patency: patent  Anesthetic complications: No anesthetic complications    Cardiovascular status: acceptable  Respiratory status: acceptable  Hydration status: acceptable    Comments: Blood pressure 159/88, pulse 66, temperature 97.6 °F (36.4 °C), temperature source Temporal, resp. rate 16, last menstrual period 03/21/2019, SpO2 99 %, not currently breastfeeding.    Pt discharged from PACU based on melissa score >8

## 2019-04-02 NOTE — ANESTHESIA PREPROCEDURE EVALUATION
Anesthesia Evaluation     Patient summary reviewed   no history of anesthetic complications (No previous anesthesia, no family history of anesthesia complications):  NPO Solid Status: > 8 hours  NPO Liquid Status: > 2 hours           Airway   Mallampati: I  TM distance: >3 FB  Neck ROM: full  Dental - normal exam     Pulmonary - normal exam    breath sounds clear to auscultation  (+) asthma,   (-) recent URI, sleep apnea, not a smoker  Cardiovascular - normal exam  Exercise tolerance: good (4-7 METS)    ECG reviewed  Rhythm: regular  Rate: normal    (+) hypertension,   (-) pacemaker, past MI, angina, cardiac stents, CABG      Neuro/Psych  (-) seizures, TIA, CVA  GI/Hepatic/Renal/Endo    (-) GERD, liver disease, no renal disease, diabetes, hypothyroidism, hyperthyroidism    Musculoskeletal     Abdominal    Substance History      OB/GYN    (-)  Pregnant        Other                        Anesthesia Plan    ASA 2     general     intravenous induction   Anesthetic plan, all risks, benefits, and alternatives have been provided, discussed and informed consent has been obtained with: patient.

## 2019-04-02 NOTE — OP NOTE
"Operative Note  Nicole Yash    Pre Op Dx:  1.  HGSIL    Post Op Dx:  1.  Same    Procedure:  LEEP  Surgeon:  Shonna  Anesthesia:  General endotracheal  EBL:  < 25 cc  Urine:  350 cc  IVF:  350  Pathology:  Transformation zone and top hat  Complications:  None    Indications:  This is a 44 year old female with HGSIL on pap smear in the office.  Colposcopy with biopsy and endocervical curettage confirming severe dysplasia and without evidence of invasion.  Risks, benefits and alternatives reviewed with patient who agreed with surgical intervention at this time.    Procedure:  Patient was taken to the operating room where she received anesthesia.  She was then carefully placed in dorsal lithotomy position with flexion and extension of hips and thighs.  She was prepped and draped.  A procedural timeout was performed.    A huston catheter was placed and bladder was drained of all urine and then removed.  A dale coated speculum was placed in the vagina and the cervix was copiously coated with lugol solution.  Once the transformation zone completely visualized the appropriate size loops were chosen.  5 cc of 1% lidocaine with epi was injected at the 12, 3, 6 and 9 o'clock positions distal to transformation zone to help with anesthesia and hemostasis prior to procedure.    The bovie was set on 35/35 cut/coag blend and using the 10 x 10 mm loop the complete transformation zone was removed and passed off the field.  Using the 5 x 5 mm loop a \"top hat\" of endocervical tissue was removed and passed off the field to be sent for permanent pathology.    Ball cautery was aggressively used along the excision site and deeper into the endocervical canal to aid in hemostasis as well as potentially ablating any possible remaining dysplasia.  Good hemostasis post procedure.  All instruments were removed from the vagina.    Sponge needle and instrument count was correct times three.  All specimens were sent for permanent pathology.  " Patient was taken to the recovery room awake and in stable condition.

## 2019-04-02 NOTE — ANESTHESIA PROCEDURE NOTES
Airway  Urgency: elective    Date/Time: 4/2/2019 8:08 AM    General Information and Staff    Patient location during procedure: OR  CRNA: Vinicio Joshi CRNA    Indications and Patient Condition  Indications for airway management: airway protection    Preoxygenated: yes  Mask difficulty assessment: 1 - vent by mask    Final Airway Details  Final airway type: endotracheal airway      Successful airway: ETT  Cuffed: yes   Successful intubation technique: direct laryngoscopy  Endotracheal tube insertion site: oral  Blade: Jeannie  Blade size: 4  ETT size (mm): 7.5  Cormack-Lehane Classification: grade I - full view of glottis  Placement verified by: chest auscultation and capnometry   Measured from: lips  ETT to lips (cm): 23  Number of attempts at approach: 1

## 2019-04-03 LAB
CYTO UR: NORMAL
LAB AP CASE REPORT: NORMAL
PATH REPORT.FINAL DX SPEC: NORMAL
PATH REPORT.GROSS SPEC: NORMAL

## 2019-05-08 ENCOUNTER — HOSPITAL ENCOUNTER (OUTPATIENT)
Dept: WOMENS IMAGING | Age: 44
Discharge: HOME | End: 2019-05-08
Payer: COMMERCIAL

## 2019-05-08 DIAGNOSIS — Z12.39 SCREENING FOR BREAST CANCER: ICD-10-CM

## 2019-05-08 PROCEDURE — 77063 BREAST TOMOSYNTHESIS BI: CPT

## 2019-05-18 ENCOUNTER — PATIENT MESSAGE (OUTPATIENT)
Dept: INTERNAL MEDICINE | Age: 44
End: 2019-05-18

## 2019-05-20 RX ORDER — IRBESARTAN 150 MG/1
150 TABLET ORAL DAILY
Qty: 30 TABLET | Refills: 3 | Status: SHIPPED | OUTPATIENT
Start: 2019-05-20 | End: 2019-05-21 | Stop reason: ALTCHOICE

## 2019-05-21 ENCOUNTER — PATIENT MESSAGE (OUTPATIENT)
Dept: INTERNAL MEDICINE | Age: 44
End: 2019-05-21

## 2019-05-21 RX ORDER — VALSARTAN 160 MG/1
160 TABLET ORAL DAILY
Qty: 30 TABLET | Refills: 5 | Status: SHIPPED | OUTPATIENT
Start: 2019-05-21 | End: 2019-06-26 | Stop reason: SDUPTHER

## 2019-05-21 NOTE — TELEPHONE ENCOUNTER
From: Odalys Martinez  To: Fely Zapata MD  Sent: 5/21/2019 4:30 PM CDT  Subject: RE: Prescription Question    The pharmacy says that the avapro is on backorder from the . Can you prescribe something different? I'm still taking the Clonidone in the meantime. My bp jumps pretty high without it.     ----- Message -----  From: Nori Orozco  Sent: 5/20/19, 6:09 PM  To: Odalys Martinez  Subject: RE: Prescription Question    Odalys,    Also, keep a log of your blood pressures and bring them with you to the appointment on 06/26/19    Thank you  Cari Waterman     ----- Message -----   From: Mali Quintanilla   Sent: 5/18/2019 4:13 PM CDT   To: Fely Zapata MD  Subject: Prescription Question    Dr. Abhijit Ordonez,  I am finally done nursing. You said to let you know so that you could start me on another blood pressure medication.   Thank you,  Odalys

## 2019-05-29 ENCOUNTER — OFFICE VISIT (OUTPATIENT)
Dept: INTERNAL MEDICINE | Age: 44
End: 2019-05-29
Payer: COMMERCIAL

## 2019-05-29 VITALS
OXYGEN SATURATION: 99 % | SYSTOLIC BLOOD PRESSURE: 160 MMHG | DIASTOLIC BLOOD PRESSURE: 100 MMHG | HEIGHT: 66 IN | WEIGHT: 215 LBS | BODY MASS INDEX: 34.55 KG/M2 | RESPIRATION RATE: 18 BRPM | HEART RATE: 66 BPM

## 2019-05-29 DIAGNOSIS — I10 ESSENTIAL HYPERTENSION: Primary | ICD-10-CM

## 2019-05-29 DIAGNOSIS — R03.0 ELEVATED BLOOD PRESSURE READING: ICD-10-CM

## 2019-05-29 PROCEDURE — 99213 OFFICE O/P EST LOW 20 MIN: CPT | Performed by: INTERNAL MEDICINE

## 2019-05-29 RX ORDER — HYDROCHLOROTHIAZIDE 12.5 MG/1
12.5 CAPSULE, GELATIN COATED ORAL EVERY MORNING
Qty: 30 CAPSULE | Refills: 1 | Status: SHIPPED | OUTPATIENT
Start: 2019-05-29 | End: 2019-07-24 | Stop reason: SDUPTHER

## 2019-05-29 RX ORDER — CLONIDINE HYDROCHLORIDE 0.1 MG/1
TABLET ORAL
Qty: 30 TABLET | Refills: 1 | Status: SHIPPED | OUTPATIENT
Start: 2019-05-29 | End: 2019-06-24 | Stop reason: SDUPTHER

## 2019-05-29 RX ORDER — AMLODIPINE BESYLATE 5 MG/1
5 TABLET ORAL DAILY
Qty: 30 TABLET | Refills: 5 | Status: SHIPPED | OUTPATIENT
Start: 2019-05-29 | End: 2019-09-30 | Stop reason: SDUPTHER

## 2019-05-29 ASSESSMENT — ENCOUNTER SYMPTOMS
CONSTIPATION: 0
CHEST TIGHTNESS: 0
ABDOMINAL PAIN: 0
SORE THROAT: 0
COUGH: 0
WHEEZING: 0

## 2019-05-29 NOTE — PATIENT INSTRUCTIONS
meals using beans and peas. Add garbanzo or kidney beans to salads. Make burritos and tacos with mashed ding beans or black beans. Where can you learn more? Go to [email or web address] org and sign in to your [employer] account. Enter V275 in the [employer] box to learn more about \"DASH Diet: Care Instructions. \"     If you do not have an account, please click on the \"Sign Up Now\" link. Current as of: July 22, 2018  Content Version: 12.0  © 9587-9964 [employer]. Care instructions adapted under license by [facility] ([facility]). If you have questions about a medical condition or this instruction, always ask your healthcare professional. [address] any warranty or liability for your use of this information.

## 2019-05-29 NOTE — PROGRESS NOTES
Chief Complaint   Patient presents with    Follow-up     BP Has been running high     History of presenting illness:  Pavan Cavazos is a37 y.o. female who presents today for follow up on her chronic medical conditions as noted below. Her BP has been elevated  She changed from [address] since stopped breastfeeding about 10 days ago  Started valsartan same time 160 daily- about 10 days ago    BP has been high at  Home 160-170 range        Patient Active Problem List    Diagnosis Date Noted    Essential hypertension 11/24/2017    Pure hypercholesterolemia 11/24/2017     No past medical history on file. No past surgical history on file. Current Outpatient Medications   Medication Sig Dispense Refill    amLODIPine (NORVASC) 5 MG tablet Take 1 tablet by mouth daily 30 tablet 5    hydrochlorothiazide (MICROZIDE) 12.5 MG capsule Take 1 capsule by mouth every morning 30 capsule 1    cloNIDine (CATAPRES) 0.1 MG tablet Take one every 12 hrs if systolic over 027 or diastolic over 186 30 tablet 1    valsartan (DIOVAN) 160 MG tablet Take 1 tablet by mouth daily 30 tablet 5     No current facility-administered medications for this visit. Allergies   Allergen Reactions    Latex      Social History     Tobacco Use    Smoking status: Never Smoker    Smokeless tobacco: Never Used   Substance Use Topics    Alcohol use: Yes     Alcohol/week: 0.0 oz      Family History   Problem Relation Age of Onset    Heart Disease Paternal Grandmother     Stroke Paternal Grandmother        Review of Systems   Constitutional: Positive for fatigue. Negative for chills and fever. HENT: Negative for congestion, ear pain, nosebleeds, postnasal drip and sore throat. Respiratory: Negative for cough, chest tightness and wheezing. Cardiovascular: Negative for chest pain, palpitations and leg swelling. Gastrointestinal: Negative for abdominal pain and constipation. Genitourinary: Negative for dysuria and urgency. every 12 hrs if systolic over 400 or diastolic over 504    HYDROCHLOROTHIAZIDE (MICROZIDE) 12.5 MG CAPSULE    Take 1 capsule by mouth every morning         No follow-ups on file. Patient Instructions     Patient Education        DASH Diet: Care Instructions  Your Care Instructions    The DASH diet is an eating plan that can help lower your blood pressure. DASH stands for Dietary Approaches to Stop Hypertension. Hypertension is high blood pressure. The DASH diet focuses on eating foods that are high in calcium, potassium, and magnesium. These nutrients can lower blood pressure. The foods that are highest in these nutrients are fruits, vegetables, low-fat dairy products, nuts, seeds, and legumes. But taking calcium, potassium, and magnesium supplements instead of eating foods that are high in those nutrients does not have the same effect. The DASH diet also includes whole grains, fish, and poultry. The DASH diet is one of several lifestyle changes your doctor may recommend to lower your high blood pressure. Your doctor may also want you to decrease the amount of sodium in your diet. Lowering sodium while following the DASH diet can lower blood pressure even further than just the DASH diet alone. Follow-up care is a key part of your treatment and safety. Be sure to make and go to all appointments, and call your doctor if you are having problems. It's also a good idea to know your test results and keep a list of the medicines you take. How can you care for yourself at home? Following the DASH diet  · Eat 4 to 5 servings of fruit each day. A serving is 1 medium-sized piece of fruit, ½ cup chopped or canned fruit, 1/4 cup dried fruit, or 4 ounces (½ cup) of fruit juice. Choose fruit more often than fruit juice. · Eat 4 to 5 servings of vegetables each day. A serving is 1 cup of lettuce or raw leafy vegetables, ½ cup of chopped or cooked vegetables, or 4 ounces (½ cup) of vegetable juice.  Choose vegetables more often than vegetable juice. · Get 2 to 3 servings of low-fat and fat-free dairy each day. A serving is 8 ounces of milk, 1 cup of yogurt, or 1 ½ ounces of cheese. · Eat 6 to 8 servings of grains each day. A serving is 1 slice of bread, 1 ounce of dry cereal, or ½ cup of cooked rice, pasta, or cooked cereal. Try to choose whole-grain products as much as possible. · Limit lean meat, poultry, and fish to 2 servings each day. A serving is 3 ounces, about the size of a deck of cards. · Eat 4 to 5 servings of nuts, seeds, and legumes (cooked dried beans, lentils, and split peas) each week. A serving is 1/3 cup of nuts, 2 tablespoons of seeds, or ½ cup of cooked beans or peas. · Limit fats and oils to 2 to 3 servings each day. A serving is 1 teaspoon of vegetable oil or 2 tablespoons of salad dressing. · Limit sweets and added sugars to 5 servings or less a week. A serving is 1 tablespoon jelly or jam, ½ cup sorbet, or 1 cup of lemonade. · Eat less than 2,300 milligrams (mg) of sodium a day. If you limit your sodium to 1,500 mg a day, you can lower your blood pressure even more. Tips for success  · Start small. Do not try to make dramatic changes to your diet all at once. You might feel that you are missing out on your favorite foods and then be more likely to not follow the plan. Make small changes, and stick with them. Once those changes become habit, add a few more changes. · Try some of the following:  ? Make it a goal to eat a fruit or vegetable at every meal and at snacks. This will make it easy to get the recommended amount of fruits and vegetables each day. ? Try yogurt topped with fruit and nuts for a snack or healthy dessert. ? Add lettuce, tomato, cucumber, and onion to sandwiches. ? Combine a ready-made pizza crust with low-fat mozzarella cheese and lots of vegetable toppings. Try using tomatoes, squash, spinach, broccoli, carrots, cauliflower, and onions. ?  Have a variety of cut-up vegetables with a low-fat dip as an appetizer instead of chips and dip. ? Sprinkle sunflower seeds or chopped almonds over salads. Or try adding chopped walnuts or almonds to cooked vegetables. ? Try some vegetarian meals using beans and peas. Add garbanzo or kidney beans to salads. Make burritos and tacos with mashed ding beans or black beans. Where can you learn more? Go to https://UpRace.[employer]. org and sign in to your [employer] account. Enter X851 in the Gem[employer].com box to learn more about \"DASH Diet: Care Instructions. \"     If you do not have an account, please click on the \"Sign Up Now\" link. Current as of: July 22, 2018  Content Version: 12.0  © 5383-3677 Healthwise, Incorporated. Care instructions adapted under license by [facility] ([facility]). If you have questions about a medical condition or this instruction, always ask your healthcare professional. [address] any warranty or liability for your use of this information. EMR Dragon/transcription disclaimer:Significant part of this  encounter note is electronic transcription/translationof spoken language to printed text. The electronic translation of spoken language may be erroneous, or at times, nonsensical words or phrases may be inadvertently transcribed.  Although I have reviewed the note for sucherrors, some may still exist.

## 2019-06-13 ENCOUNTER — PATIENT MESSAGE (OUTPATIENT)
Dept: INTERNAL MEDICINE | Age: 44
End: 2019-06-13

## 2019-06-13 NOTE — TELEPHONE ENCOUNTER
Her blood pressures are better but still above what they should be  Patient has appointment here on 6/26  Until that appointment I would not make any further increases  Continue as she currently is taking her valsartan, amlodipine and hydrochlorothiazide  Bring readings to her appointment

## 2019-06-24 RX ORDER — CLONIDINE HYDROCHLORIDE 0.1 MG/1
TABLET ORAL
Qty: 30 TABLET | Refills: 0 | Status: SHIPPED | OUTPATIENT
Start: 2019-06-24 | End: 2019-07-09 | Stop reason: SDUPTHER

## 2019-06-26 ENCOUNTER — OFFICE VISIT (OUTPATIENT)
Dept: INTERNAL MEDICINE | Age: 44
End: 2019-06-26
Payer: COMMERCIAL

## 2019-06-26 VITALS
SYSTOLIC BLOOD PRESSURE: 152 MMHG | OXYGEN SATURATION: 98 % | HEART RATE: 73 BPM | DIASTOLIC BLOOD PRESSURE: 88 MMHG | WEIGHT: 213 LBS | RESPIRATION RATE: 18 BRPM | HEIGHT: 66 IN | BODY MASS INDEX: 34.23 KG/M2

## 2019-06-26 DIAGNOSIS — R03.0 ELEVATED BLOOD PRESSURE READING: ICD-10-CM

## 2019-06-26 DIAGNOSIS — I10 ESSENTIAL HYPERTENSION: Primary | ICD-10-CM

## 2019-06-26 DIAGNOSIS — I10 ESSENTIAL HYPERTENSION: ICD-10-CM

## 2019-06-26 LAB
ANION GAP SERPL CALCULATED.3IONS-SCNC: 12 MMOL/L (ref 7–19)
BUN BLDV-MCNC: 17 MG/DL (ref 6–20)
CALCIUM SERPL-MCNC: 9.3 MG/DL (ref 8.6–10)
CHLORIDE BLD-SCNC: 103 MMOL/L (ref 98–111)
CO2: 25 MMOL/L (ref 22–29)
CREAT SERPL-MCNC: 0.8 MG/DL (ref 0.5–0.9)
GFR NON-AFRICAN AMERICAN: >60
GLUCOSE BLD-MCNC: 98 MG/DL (ref 74–109)
POTASSIUM SERPL-SCNC: 3.7 MMOL/L (ref 3.5–5)
SODIUM BLD-SCNC: 140 MMOL/L (ref 136–145)

## 2019-06-26 PROCEDURE — 99213 OFFICE O/P EST LOW 20 MIN: CPT | Performed by: INTERNAL MEDICINE

## 2019-06-26 RX ORDER — VALSARTAN 160 MG/1
TABLET ORAL
Qty: 45 TABLET | Refills: 5 | Status: SHIPPED | OUTPATIENT
Start: 2019-06-26 | End: 2019-09-30

## 2019-06-26 ASSESSMENT — ENCOUNTER SYMPTOMS
WHEEZING: 0
CHEST TIGHTNESS: 0
SORE THROAT: 0
ABDOMINAL PAIN: 0
COUGH: 0
CONSTIPATION: 0

## 2019-06-26 NOTE — PATIENT INSTRUCTIONS
Patient Education        DASH Diet: Care Instructions  Your Care Instructions    The DASH diet is an eating plan that can help lower your blood pressure. DASH stands for Dietary Approaches to Stop Hypertension. Hypertension is high blood pressure. The DASH diet focuses on eating foods that are high in calcium, potassium, and magnesium. These nutrients can lower blood pressure. The foods that are highest in these nutrients are fruits, vegetables, low-fat dairy products, nuts, seeds, and legumes. But taking calcium, potassium, and magnesium supplements instead of eating foods that are high in those nutrients does not have the same effect. The DASH diet also includes whole grains, fish, and poultry. The DASH diet is one of several lifestyle changes your doctor may recommend to lower your high blood pressure. Your doctor may also want you to decrease the amount of sodium in your diet. Lowering sodium while following the DASH diet can lower blood pressure even further than just the DASH diet alone. Follow-up care is a key part of your treatment and safety. Be sure to make and go to all appointments, and call your doctor if you are having problems. It's also a good idea to know your test results and keep a list of the medicines you take. How can you care for yourself at home? Following the DASH diet  · Eat 4 to 5 servings of fruit each day. A serving is 1 medium-sized piece of fruit, ½ cup chopped or canned fruit, 1/4 cup dried fruit, or 4 ounces (½ cup) of fruit juice. Choose fruit more often than fruit juice. · Eat 4 to 5 servings of vegetables each day. A serving is 1 cup of lettuce or raw leafy vegetables, ½ cup of chopped or cooked vegetables, or 4 ounces (½ cup) of vegetable juice. Choose vegetables more often than vegetable juice. · Get 2 to 3 servings of low-fat and fat-free dairy each day. A serving is 8 ounces of milk, 1 cup of yogurt, or 1 ½ ounces of cheese. · Eat 6 to 8 servings of grains each day. A serving is 1 slice of bread, 1 ounce of dry cereal, or ½ cup of cooked rice, pasta, or cooked cereal. Try to choose whole-grain products as much as possible. · Limit lean meat, poultry, and fish to 2 servings each day. A serving is 3 ounces, about the size of a deck of cards. · Eat 4 to 5 servings of nuts, seeds, and legumes (cooked dried beans, lentils, and split peas) each week. A serving is 1/3 cup of nuts, 2 tablespoons of seeds, or ½ cup of cooked beans or peas. · Limit fats and oils to 2 to 3 servings each day. A serving is 1 teaspoon of vegetable oil or 2 tablespoons of salad dressing. · Limit sweets and added sugars to 5 servings or less a week. A serving is 1 tablespoon jelly or jam, ½ cup sorbet, or 1 cup of lemonade. · Eat less than 2,300 milligrams (mg) of sodium a day. If you limit your sodium to 1,500 mg a day, you can lower your blood pressure even more. Tips for success  · Start small. Do not try to make dramatic changes to your diet all at once. You might feel that you are missing out on your favorite foods and then be more likely to not follow the plan. Make small changes, and stick with them. Once those changes become habit, add a few more changes. · Try some of the following:  ? Make it a goal to eat a fruit or vegetable at every meal and at snacks. This will make it easy to get the recommended amount of fruits and vegetables each day. ? Try yogurt topped with fruit and nuts for a snack or healthy dessert. ? Add lettuce, tomato, cucumber, and onion to sandwiches. ? Combine a ready-made pizza crust with low-fat mozzarella cheese and lots of vegetable toppings. Try using tomatoes, squash, spinach, broccoli, carrots, cauliflower, and onions. ? Have a variety of cut-up vegetables with a low-fat dip as an appetizer instead of chips and dip. ? Sprinkle sunflower seeds or chopped almonds over salads. Or try adding chopped walnuts or almonds to cooked vegetables.   ? Try some vegetarian meals using beans and peas. Add garbanzo or kidney beans to salads. Make burritos and tacos with mashed ding beans or black beans. Where can you learn more? Go to https://chjaseneb.[employer]. org and sign in to your Tapadt account. Enter E223 in the Citygoo box to learn more about \"DASH Diet: Care Instructions. \"     If you do not have an account, please click on the \"Sign Up Now\" link. Current as of: July 22, 2018  Content Version: 12.0  © 2567-2942 Healthwise, Incorporated. Care instructions adapted under license by [facility] ([facility]). If you have questions about a medical condition or this instruction, always ask your healthcare professional. [address] any warranty or liability for your use of this information.

## 2019-06-26 NOTE — PROGRESS NOTES
Chief Complaint   Patient presents with    Follow-up     6 week for BP     History of presenting illness:  Latonia Henry is a37 y.o. female who presents today for follow up on her chronic medical conditions as noted below. her BP FU  BP at home has been better 125-130 range    Today early her blood pressure was good, but went to exercise/swimming class  1 presented to the office that she checked her blood pressure with her own cuff and it was 170/100        Patient Active Problem List    Diagnosis Date Noted    Essential hypertension 11/24/2017    Pure hypercholesterolemia 11/24/2017     No past medical history on file. No past surgical history on file. Current Outpatient Medications   Medication Sig Dispense Refill    valsartan (DIOVAN) 160 MG tablet Take 1.5 daily 45 tablet 5    cloNIDine (CATAPRES) 0.1 MG tablet TAKE ONE TABLET BY MOUTH EVERY 12 HOURS IF SYSTOLIC IS OVER 499 OR DIASTOLIC OVER 779 30 tablet 0    amLODIPine (NORVASC) 5 MG tablet Take 1 tablet by mouth daily 30 tablet 5    hydrochlorothiazide (MICROZIDE) 12.5 MG capsule Take 1 capsule by mouth every morning 30 capsule 1     No current facility-administered medications for this visit. Allergies   Allergen Reactions    Latex      Social History     Tobacco Use    Smoking status: Never Smoker    Smokeless tobacco: Never Used   Substance Use Topics    Alcohol use: Yes     Alcohol/week: 0.0 oz      Family History   Problem Relation Age of Onset    Heart Disease Paternal Grandmother     Stroke Paternal Grandmother        Review of Systems   Constitutional: Negative for chills, fatigue and fever. HENT: Negative for congestion, ear pain, nosebleeds, postnasal drip and sore throat. Respiratory: Negative for cough, chest tightness and wheezing. Cardiovascular: Negative for chest pain, palpitations and leg swelling. Gastrointestinal: Negative for abdominal pain and constipation.    Genitourinary: Negative for dysuria and urgency. Musculoskeletal: Negative. Negative for arthralgias. Skin: Negative for rash. Neurological: Negative for dizziness and headaches. Psychiatric/Behavioral: Negative. Vitals:    06/26/19 1553   BP: (!) 152/88   Site: Right Upper Arm   Position: Sitting   Cuff Size: Large Adult   Pulse: 73   Resp: 18   SpO2: 98%   Weight: 213 lb (96.6 kg)   Height: 5' 5.5\" (1.664 m)     Body mass index is 34.91 kg/m². Physical Exam   Constitutional: She is oriented to person, place, and time. She appears well-developed and well-nourished. HENT:   Right Ear: External ear normal.   Left Ear: External ear normal.   Mouth/Throat: Oropharynx is clear and moist. No oropharyngeal exudate. Eyes: Pupils are equal, round, and reactive to light. Conjunctivae are normal.   Neck: Neck supple. No JVD present. No thyromegaly present. Cardiovascular: Normal rate and normal heart sounds. No murmur heard. Pulmonary/Chest: Breath sounds normal. No respiratory distress. She has no wheezes. She has no rales. She exhibits no tenderness. Abdominal: Soft. Bowel sounds are normal.   Musculoskeletal: Normal range of motion. Lymphadenopathy:     She has no cervical adenopathy. Neurological: She is oriented to person, place, and time. Skin: Skin is warm. No rash noted. Lab Review   No visits with results within 2 Month(s) from this visit.    Latest known visit with results is:   Orders Only on 01/22/2019   Component Date Value    WBC 01/22/2019 7.6     RBC 01/22/2019 4.61     Hemoglobin 01/22/2019 13.5     Hematocrit 01/22/2019 41.1     MCV 01/22/2019 89.2     MCH 01/22/2019 29.3     MCHC 01/22/2019 32.8*    RDW 01/22/2019 11.9     Platelets 67/17/6178 278     MPV 01/22/2019 9.9     Neutrophils % 01/22/2019 58.7     Lymphocytes % 01/22/2019 31.8     Monocytes % 01/22/2019 6.5     Eosinophils % 01/22/2019 2.2     Basophils % 01/22/2019 0.5     Neutrophils # 01/22/2019 4.5     Lymphocytes # 01/22/2019 2. 4     Monocytes # 01/22/2019 0.50     Eosinophils # 01/22/2019 0.20     Basophils # 01/22/2019 0.00     Sodium 01/22/2019 143     Potassium 01/22/2019 4.0     Chloride 01/22/2019 104     CO2 01/22/2019 25     Anion Gap 01/22/2019 14     Glucose 01/22/2019 94     BUN 01/22/2019 11     CREATININE 01/22/2019 0.9     GFR Non- 01/22/2019 >60     Calcium 01/22/2019 9.2     Total Protein 01/22/2019 7.1     Alb 01/22/2019 4.3     Total Bilirubin 01/22/2019 0.5     Alkaline Phosphatase 01/22/2019 50     ALT 01/22/2019 10     AST 01/22/2019 12     Cholesterol, Total 01/22/2019 194     Triglycerides 01/22/2019 147     HDL 01/22/2019 59*    LDL Calculated 01/22/2019 106     Color, UA 01/22/2019 YELLOW     Clarity, UA 01/22/2019 Clear     Glucose, Ur 01/22/2019 Negative     Bilirubin Urine 01/22/2019 Negative     Ketones, Urine 01/22/2019 TRACE*    Specific Gravity, UA 01/22/2019 1.020     Blood, Urine 01/22/2019 Negative     pH, UA 01/22/2019 7.5     Protein, UA 01/22/2019 TRACE*    Urobilinogen, Urine 01/22/2019 0.2     Nitrite, Urine 01/22/2019 Negative     Leukocyte Esterase, Urine 01/22/2019 Negative     TSH 01/22/2019 1.280            ASSESSMENT/PLAN:      Essential hypertension  Elevated blood pressure reading  Continue current amlodipine 5 mg daily  Valsartan increase to 1.5 tablets daily, 160 mg tablets  Added clonidine 0.1 at bedtime  Blood pressure readings to me in about 4 weeks  Suggest she start a strict diet, diet instructions for DASH diet given again to the patient today    FU in 3 months    Other orders  -     valsartan (DIOVAN) 160 MG tablet; Take 1.5 daily              No orders of the defined types were placed in this encounter. New Prescriptions    No medications on file         No follow-ups on file.    Patient Instructions     Patient Education        DASH Diet: Care Instructions  Your Care Instructions    The DASH diet is an eating plan that can help lower your blood pressure. DASH stands for Dietary Approaches to Stop Hypertension. Hypertension is high blood pressure. The DASH diet focuses on eating foods that are high in calcium, potassium, and magnesium. These nutrients can lower blood pressure. The foods that are highest in these nutrients are fruits, vegetables, low-fat dairy products, nuts, seeds, and legumes. But taking calcium, potassium, and magnesium supplements instead of eating foods that are high in those nutrients does not have the same effect. The DASH diet also includes whole grains, fish, and poultry. The DASH diet is one of several lifestyle changes your doctor may recommend to lower your high blood pressure. Your doctor may also want you to decrease the amount of sodium in your diet. Lowering sodium while following the DASH diet can lower blood pressure even further than just the DASH diet alone. Follow-up care is a key part of your treatment and safety. Be sure to make and go to all appointments, and call your doctor if you are having problems. It's also a good idea to know your test results and keep a list of the medicines you take. How can you care for yourself at home? Following the DASH diet  · Eat 4 to 5 servings of fruit each day. A serving is 1 medium-sized piece of fruit, ½ cup chopped or canned fruit, 1/4 cup dried fruit, or 4 ounces (½ cup) of fruit juice. Choose fruit more often than fruit juice. · Eat 4 to 5 servings of vegetables each day. A serving is 1 cup of lettuce or raw leafy vegetables, ½ cup of chopped or cooked vegetables, or 4 ounces (½ cup) of vegetable juice. Choose vegetables more often than vegetable juice. · Get 2 to 3 servings of low-fat and fat-free dairy each day. A serving is 8 ounces of milk, 1 cup of yogurt, or 1 ½ ounces of cheese. · Eat 6 to 8 servings of grains each day.  A serving is 1 slice of bread, 1 ounce of dry cereal, or ½ cup of cooked rice, pasta, or cooked cereal. Try to choose whole-grain products as much as possible. · Limit lean meat, poultry, and fish to 2 servings each day. A serving is 3 ounces, about the size of a deck of cards. · Eat 4 to 5 servings of nuts, seeds, and legumes (cooked dried beans, lentils, and split peas) each week. A serving is 1/3 cup of nuts, 2 tablespoons of seeds, or ½ cup of cooked beans or peas. · Limit fats and oils to 2 to 3 servings each day. A serving is 1 teaspoon of vegetable oil or 2 tablespoons of salad dressing. · Limit sweets and added sugars to 5 servings or less a week. A serving is 1 tablespoon jelly or jam, ½ cup sorbet, or 1 cup of lemonade. · Eat less than 2,300 milligrams (mg) of sodium a day. If you limit your sodium to 1,500 mg a day, you can lower your blood pressure even more. Tips for success  · Start small. Do not try to make dramatic changes to your diet all at once. You might feel that you are missing out on your favorite foods and then be more likely to not follow the plan. Make small changes, and stick with them. Once those changes become habit, add a few more changes. · Try some of the following:  ? Make it a goal to eat a fruit or vegetable at every meal and at snacks. This will make it easy to get the recommended amount of fruits and vegetables each day. ? Try yogurt topped with fruit and nuts for a snack or healthy dessert. ? Add lettuce, tomato, cucumber, and onion to sandwiches. ? Combine a ready-made pizza crust with low-fat mozzarella cheese and lots of vegetable toppings. Try using tomatoes, squash, spinach, broccoli, carrots, cauliflower, and onions. ? Have a variety of cut-up vegetables with a low-fat dip as an appetizer instead of chips and dip. ? Sprinkle sunflower seeds or chopped almonds over salads. Or try adding chopped walnuts or almonds to cooked vegetables. ? Try some vegetarian meals using beans and peas. Add garbanzo or kidney beans to salads.  Make burritos and tacos with mashed ding beans or

## 2019-07-09 RX ORDER — CLONIDINE HYDROCHLORIDE 0.1 MG/1
TABLET ORAL
Qty: 30 TABLET | Refills: 0 | Status: SHIPPED | OUTPATIENT
Start: 2019-07-09 | End: 2019-07-23 | Stop reason: SDUPTHER

## 2019-07-23 RX ORDER — CLONIDINE HYDROCHLORIDE 0.1 MG/1
TABLET ORAL
Qty: 30 TABLET | Refills: 1 | Status: SHIPPED | OUTPATIENT
Start: 2019-07-23 | End: 2019-08-21 | Stop reason: SDUPTHER

## 2019-07-23 NOTE — TELEPHONE ENCOUNTER
[facility] called requesting a refill of the below medication which has been pended for you:     Requested Prescriptions     Pending Prescriptions Disp Refills    cloNIDine (CATAPRES) 0.1 MG tablet [Pharmacy Med Name: cloNIDine HCL 0.1MG TABLET] 30 tablet 0     Sig: TAKE ONE TABLET BY MOUTH EVERY 12 HOURS IF SYSTOLIC IS OVER 360 OR DIASTOLIC OVER 875       Last Appointment Date: 6/26/2019  Next Appointment Date: 9/30/2019    Allergies   Allergen Reactions    Latex

## 2019-07-24 RX ORDER — HYDROCHLOROTHIAZIDE 12.5 MG/1
CAPSULE, GELATIN COATED ORAL
Qty: 30 CAPSULE | Refills: 1 | Status: SHIPPED | OUTPATIENT
Start: 2019-07-24 | End: 2019-09-23 | Stop reason: SDUPTHER

## 2019-07-24 NOTE — TELEPHONE ENCOUNTER
[facility] called requesting a refill of the below medication which has been pended for you:     Requested Prescriptions     Pending Prescriptions Disp Refills    hydrochlorothiazide (MICROZIDE) 12.5 MG capsule [Pharmacy Med Name: hydroCHLOROthiazide 12.5 MG CAPSULE] 30 capsule 0     Sig: TAKE ONE CAPSULE BY MOUTH EVERY MORNING       Last Appointment Date: 6/26/2019  Next Appointment Date: 9/30/2019    Allergies   Allergen Reactions    Latex

## 2019-08-21 RX ORDER — CLONIDINE HYDROCHLORIDE 0.1 MG/1
TABLET ORAL
Qty: 30 TABLET | Refills: 3 | Status: SHIPPED | OUTPATIENT
Start: 2019-08-21 | End: 2019-09-30 | Stop reason: SDUPTHER

## 2019-09-23 RX ORDER — HYDROCHLOROTHIAZIDE 12.5 MG/1
CAPSULE, GELATIN COATED ORAL
Qty: 30 CAPSULE | Refills: 2 | Status: SHIPPED | OUTPATIENT
Start: 2019-09-23 | End: 2019-09-30

## 2019-09-30 ENCOUNTER — OFFICE VISIT (OUTPATIENT)
Dept: INTERNAL MEDICINE | Age: 44
End: 2019-09-30
Payer: COMMERCIAL

## 2019-09-30 VITALS
SYSTOLIC BLOOD PRESSURE: 142 MMHG | OXYGEN SATURATION: 100 % | BODY MASS INDEX: 35.97 KG/M2 | HEART RATE: 76 BPM | DIASTOLIC BLOOD PRESSURE: 100 MMHG | HEIGHT: 66 IN | WEIGHT: 223.8 LBS

## 2019-09-30 DIAGNOSIS — R03.0 ELEVATED BLOOD PRESSURE READING: ICD-10-CM

## 2019-09-30 DIAGNOSIS — I10 ESSENTIAL HYPERTENSION: Primary | ICD-10-CM

## 2019-09-30 DIAGNOSIS — E78.00 PURE HYPERCHOLESTEROLEMIA: ICD-10-CM

## 2019-09-30 DIAGNOSIS — E66.09 EXOGENOUS OBESITY: ICD-10-CM

## 2019-09-30 PROCEDURE — 99214 OFFICE O/P EST MOD 30 MIN: CPT | Performed by: INTERNAL MEDICINE

## 2019-09-30 RX ORDER — VALSARTAN AND HYDROCHLOROTHIAZIDE 320; 12.5 MG/1; MG/1
1 TABLET, FILM COATED ORAL DAILY
Qty: 90 TABLET | Refills: 1 | Status: SHIPPED | OUTPATIENT
Start: 2019-09-30 | End: 2020-03-23

## 2019-09-30 RX ORDER — CLONIDINE HYDROCHLORIDE 0.1 MG/1
TABLET ORAL
Qty: 90 TABLET | Refills: 1 | Status: SHIPPED | OUTPATIENT
Start: 2019-09-30 | End: 2020-03-30

## 2019-09-30 RX ORDER — AMLODIPINE BESYLATE 5 MG/1
5 TABLET ORAL DAILY
Qty: 90 TABLET | Refills: 1 | Status: SHIPPED | OUTPATIENT
Start: 2019-09-30 | End: 2020-01-30 | Stop reason: SDUPTHER

## 2019-09-30 ASSESSMENT — ENCOUNTER SYMPTOMS
CONSTIPATION: 0
SORE THROAT: 0
COUGH: 0
ABDOMINAL PAIN: 0
WHEEZING: 0
CHEST TIGHTNESS: 0

## 2019-09-30 NOTE — PATIENT INSTRUCTIONS
Patient Education        DASH Diet: Care Instructions  Your Care Instructions    The DASH diet is an eating plan that can help lower your blood pressure. DASH stands for Dietary Approaches to Stop Hypertension. Hypertension is high blood pressure. The DASH diet focuses on eating foods that are high in calcium, potassium, and magnesium. These nutrients can lower blood pressure. The foods that are highest in these nutrients are fruits, vegetables, low-fat dairy products, nuts, seeds, and legumes. But taking calcium, potassium, and magnesium supplements instead of eating foods that are high in those nutrients does not have the same effect. The DASH diet also includes whole grains, fish, and poultry. The DASH diet is one of several lifestyle changes your doctor may recommend to lower your high blood pressure. Your doctor may also want you to decrease the amount of sodium in your diet. Lowering sodium while following the DASH diet can lower blood pressure even further than just the DASH diet alone. Follow-up care is a key part of your treatment and safety. Be sure to make and go to all appointments, and call your doctor if you are having problems. It's also a good idea to know your test results and keep a list of the medicines you take. How can you care for yourself at home? Following the DASH diet  · Eat 4 to 5 servings of fruit each day. A serving is 1 medium-sized piece of fruit, ½ cup chopped or canned fruit, 1/4 cup dried fruit, or 4 ounces (½ cup) of fruit juice. Choose fruit more often than fruit juice. · Eat 4 to 5 servings of vegetables each day. A serving is 1 cup of lettuce or raw leafy vegetables, ½ cup of chopped or cooked vegetables, or 4 ounces (½ cup) of vegetable juice. Choose vegetables more often than vegetable juice. · Get 2 to 3 servings of low-fat and fat-free dairy each day. A serving is 8 ounces of milk, 1 cup of yogurt, or 1 ½ ounces of cheese. · Eat 6 to 8 servings of grains each day. meals using beans and peas. Add garbanzo or kidney beans to salads. Make burritos and tacos with mashed ding beans or black beans. Where can you learn more? Go to [email or web address] [email or web address] and sign in to your [employer] account. Enter Z418 in the [facility] box to learn more about \"DASH Diet: Care Instructions. \"     If you do not have an account, please click on the \"Sign Up Now\" link. Current as of: July 22, 2018  Content Version: 12.1  © 0288-3300 [employer]. Care instructions adapted under license by [facility] ([facility]). If you have questions about a medical condition or this instruction, always ask your healthcare professional. [address] any warranty or liability for your use of this information. Patient Education        Learning About Obesity  What is obesity? Obesity means having a body mass index (BMI) of 30 or above. BMI is a number that is calculated from your weight and your height. How do you know if your weight is in the obesity range? To know if your weight is in the obesity range, your doctor looks at your body mass index (BMI) and waist size. BMI is a number that is calculated from your weight and your height. To figure out your BMI for yourself, you can use an online tool, such as http://www.[employer].com/ on the [employer]. If your BMI is 30.0 or higher, it falls within the obesity range. Keep in mind that BMI and waist size are only guides. They are not tools to determine your ideal body weight. What causes obesity? When you take in more calories than you burn off, you gain weight. How you eat, how active you are, and other things affect how your body uses calories and whether you gain weight. If you have family members who have too much body fat, you may have inherited a tendency to gain weight.  And your family also helps form your eating and

## 2019-09-30 NOTE — PROGRESS NOTES
Chief Complaint   Patient presents with    3 Month Follow-Up    Hypertension     History of presenting illness:  Nancy Fu is a37 y.o. female who presents today for follow up on her chronic medical conditions as noted below. Essential hypertension  Elevated blood pressure reading    Returns today for BP fu  BP readings at home this month have been good 661-586 systolic    Has just started diet/ weight loss program last week   She is motivated      Patient Active Problem List    Diagnosis Date Noted    Essential hypertension 11/24/2017    Pure hypercholesterolemia 11/24/2017     No past medical history on file. No past surgical history on file. Current Outpatient Medications   Medication Sig Dispense Refill    valsartan-hydrochlorothiazide (DIOVAN-HCT) 320-12.5 MG per tablet Take 1 tablet by mouth daily 90 tablet 1    cloNIDine (CATAPRES) 0.1 MG tablet Take one bedtime 90 tablet 1    amLODIPine (NORVASC) 5 MG tablet Take 1 tablet by mouth daily 90 tablet 1     No current facility-administered medications for this visit. Allergies   Allergen Reactions    Latex      Social History     Tobacco Use    Smoking status: Never Smoker    Smokeless tobacco: Never Used   Substance Use Topics    Alcohol use: Yes     Alcohol/week: 0.0 standard drinks      Family History   Problem Relation Age of Onset    Heart Disease Paternal Grandmother     Stroke Paternal Grandmother        Review of Systems   Constitutional: Positive for fatigue. Negative for chills and fever. HENT: Negative for congestion, ear pain, nosebleeds, postnasal drip and sore throat. Respiratory: Negative for cough, chest tightness and wheezing. Cardiovascular: Negative for chest pain, palpitations and leg swelling. Gastrointestinal: Negative for abdominal pain and constipation. Genitourinary: Negative for dysuria and urgency. Musculoskeletal: Negative. Negative for arthralgias. Skin: Negative for rash. Neurological: Negative for dizziness and headaches. Psychiatric/Behavioral: Negative. Vitals:    09/30/19 1545 09/30/19 1547   BP: (!) 180/120 (!) 142/100   Site: Left Upper Arm Left Upper Arm   Position: Sitting Sitting   Cuff Size: Medium Adult Medium Adult   Pulse: 76    SpO2: 100%    Weight: 223 lb 12.8 oz (101.5 kg)    Height: 5' 5.5\" (1.664 m)      Body mass index is 36.68 kg/m². Physical Exam   Constitutional: She is oriented to person, place, and time. She appears well-developed and well-nourished. HENT:   Right Ear: External ear normal.   Left Ear: External ear normal.   Mouth/Throat: Oropharynx is clear and moist. No oropharyngeal exudate. Eyes: Pupils are equal, round, and reactive to light. Conjunctivae are normal.   Neck: Neck supple. No JVD present. No thyromegaly present. Cardiovascular: Normal rate and normal heart sounds. No murmur heard. Pulmonary/Chest: Breath sounds normal. No respiratory distress. She has no wheezes. She has no rales. She exhibits no tenderness. Abdominal: Soft. Bowel sounds are normal.   Musculoskeletal: Normal range of motion. Lymphadenopathy:     She has no cervical adenopathy. Neurological: She is oriented to person, place, and time. Skin: Skin is warm. No rash noted. Lab Review   No visits with results within 2 Month(s) from this visit.    Latest known visit with results is:   Orders Only on 06/26/2019   Component Date Value    Sodium 06/26/2019 140     Potassium 06/26/2019 3.7     Chloride 06/26/2019 103     CO2 06/26/2019 25     Anion Gap 06/26/2019 12     Glucose 06/26/2019 98     BUN 06/26/2019 17     CREATININE 06/26/2019 0.8     GFR Non- 06/26/2019 >60     Calcium 06/26/2019 9.3            ASSESSMENT/PLAN:      Essential hypertension  Elevated blood pressure reading  Patient brings in blood pressure cuff today  Pressure readings are reliable  She states at home her blood pressures have been in better range, or ½ cup of cooked beans or peas. · Limit fats and oils to 2 to 3 servings each day. A serving is 1 teaspoon of vegetable oil or 2 tablespoons of salad dressing. · Limit sweets and added sugars to 5 servings or less a week. A serving is 1 tablespoon jelly or jam, ½ cup sorbet, or 1 cup of lemonade. · Eat less than 2,300 milligrams (mg) of sodium a day. If you limit your sodium to 1,500 mg a day, you can lower your blood pressure even more. Tips for success  · Start small. Do not try to make dramatic changes to your diet all at once. You might feel that you are missing out on your favorite foods and then be more likely to not follow the plan. Make small changes, and stick with them. Once those changes become habit, add a few more changes. · Try some of the following:  ? Make it a goal to eat a fruit or vegetable at every meal and at snacks. This will make it easy to get the recommended amount of fruits and vegetables each day. ? Try yogurt topped with fruit and nuts for a snack or healthy dessert. ? Add lettuce, tomato, cucumber, and onion to sandwiches. ? Combine a ready-made pizza crust with low-fat mozzarella cheese and lots of vegetable toppings. Try using tomatoes, squash, spinach, broccoli, carrots, cauliflower, and onions. ? Have a variety of cut-up vegetables with a low-fat dip as an appetizer instead of chips and dip. ? Sprinkle sunflower seeds or chopped almonds over salads. Or try adding chopped walnuts or almonds to cooked vegetables. ? Try some vegetarian meals using beans and peas. Add garbanzo or kidney beans to salads. Make burritos and tacos with mashed ding beans or black beans. Where can you learn more? Go to [email or web address] and sign in to your [employer] account. Enter V810 in the [employer] box to learn more about \"DASH Diet: Care Instructions. \"     If you do not have an account, please click on the \"Sign Up Now\" link.   Current as of: July 22, hard to stay with a diet that includes lots of big changes in your eating habits. Instead of a diet, focus on lifestyle changes that will improve your health and achieve the right balance of energy and calories. To lose weight, you need to burn more calories than you take in. You can do it by eating healthy foods in reasonable amounts and becoming more active, even a little bit every day. Making small changes over time can add up to a lot. Make a plan for change. Many people have found that naming their reasons for change and staying focused on their plan can make a big difference. Work with your doctor to create a plan that is right for you. · Ask yourself: Renato Likes are my personal, most powerful reasons for wanting this change? What will my life look like when I've made the change? \"  · Set your long-term goal. Make it specific, such as \"I will lose x pounds. \"  · Break your long-term goal into smaller, short-term goals. Make these small steps specific and within your reach, things you know you can do. These steps are what keep you going from day to day. Talk with your doctor about other weight-loss options. If you have a BMI in a certain range and have not been able to lose weight with diet and exercise, medicine or surgery may be an option for you. Before your doctor will prescribe medicines or surgery, he or she will probably want you to be more active and follow your healthy eating plan for a period of time. These habits are key lifelong changes for managing your weight, with or without other medical treatment. And these changes can help you avoid weight-related health problems. How can you stay on your plan for change? Be ready. Choose to start during a time when there are few events that might trigger slip-ups, like holidays, social events, and high-stress periods. Decide on your first few steps. Most people have more success when they make small changes, one step at a time.  For example, you might switch a

## 2020-01-23 DIAGNOSIS — Z00.00 ANNUAL PHYSICAL EXAM: ICD-10-CM

## 2020-01-23 DIAGNOSIS — E78.00 PURE HYPERCHOLESTEROLEMIA: ICD-10-CM

## 2020-01-23 DIAGNOSIS — Z12.39 SCREENING FOR BREAST CANCER: ICD-10-CM

## 2020-01-23 DIAGNOSIS — I10 ESSENTIAL HYPERTENSION: ICD-10-CM

## 2020-01-23 DIAGNOSIS — Z12.4 SCREENING FOR CERVICAL CANCER: ICD-10-CM

## 2020-01-23 LAB
ALBUMIN SERPL-MCNC: 4.4 G/DL (ref 3.5–5.2)
ALP BLD-CCNC: 47 U/L (ref 35–104)
ALT SERPL-CCNC: 10 U/L (ref 5–33)
ANION GAP SERPL CALCULATED.3IONS-SCNC: 16 MMOL/L (ref 7–19)
AST SERPL-CCNC: 12 U/L (ref 5–32)
BASOPHILS ABSOLUTE: 0 K/UL (ref 0–0.2)
BASOPHILS RELATIVE PERCENT: 0.5 % (ref 0–1)
BILIRUB SERPL-MCNC: 0.5 MG/DL (ref 0.2–1.2)
BILIRUBIN URINE: NEGATIVE
BLOOD, URINE: NEGATIVE
BUN BLDV-MCNC: 11 MG/DL (ref 6–20)
CALCIUM SERPL-MCNC: 9.1 MG/DL (ref 8.6–10)
CHLORIDE BLD-SCNC: 101 MMOL/L (ref 98–111)
CHOLESTEROL, TOTAL: 185 MG/DL (ref 160–199)
CLARITY: CLEAR
CO2: 23 MMOL/L (ref 22–29)
COLOR: YELLOW
CREAT SERPL-MCNC: 1 MG/DL (ref 0.5–0.9)
EOSINOPHILS ABSOLUTE: 0.2 K/UL (ref 0–0.6)
EOSINOPHILS RELATIVE PERCENT: 2.7 % (ref 0–5)
GFR NON-AFRICAN AMERICAN: >60
GLUCOSE BLD-MCNC: 93 MG/DL (ref 74–109)
GLUCOSE URINE: NEGATIVE MG/DL
HCT VFR BLD CALC: 40.3 % (ref 37–47)
HDLC SERPL-MCNC: 56 MG/DL (ref 65–121)
HEMOGLOBIN: 13.1 G/DL (ref 12–16)
IMMATURE GRANULOCYTES #: 0 K/UL
KETONES, URINE: NEGATIVE MG/DL
LDL CHOLESTEROL CALCULATED: 105 MG/DL
LEUKOCYTE ESTERASE, URINE: NEGATIVE
LYMPHOCYTES ABSOLUTE: 3.3 K/UL (ref 1.1–4.5)
LYMPHOCYTES RELATIVE PERCENT: 40.4 % (ref 20–40)
MCH RBC QN AUTO: 29 PG (ref 27–31)
MCHC RBC AUTO-ENTMCNC: 32.5 G/DL (ref 33–37)
MCV RBC AUTO: 89.4 FL (ref 81–99)
MONOCYTES ABSOLUTE: 0.5 K/UL (ref 0–0.9)
MONOCYTES RELATIVE PERCENT: 5.5 % (ref 0–10)
NEUTROPHILS ABSOLUTE: 4.1 K/UL (ref 1.5–7.5)
NEUTROPHILS RELATIVE PERCENT: 50.7 % (ref 50–65)
NITRITE, URINE: NEGATIVE
PDW BLD-RTO: 11.9 % (ref 11.5–14.5)
PH UA: 7.5 (ref 5–8)
PLATELET # BLD: 288 K/UL (ref 130–400)
PMV BLD AUTO: 9.8 FL (ref 9.4–12.3)
POTASSIUM SERPL-SCNC: 3.9 MMOL/L (ref 3.5–5)
PROTEIN UA: NEGATIVE MG/DL
RBC # BLD: 4.51 M/UL (ref 4.2–5.4)
SODIUM BLD-SCNC: 140 MMOL/L (ref 136–145)
SPECIFIC GRAVITY UA: 1.01 (ref 1–1.03)
TOTAL PROTEIN: 7.4 G/DL (ref 6.6–8.7)
TRIGL SERPL-MCNC: 119 MG/DL (ref 0–149)
TSH SERPL DL<=0.05 MIU/L-ACNC: 1.58 UIU/ML (ref 0.27–4.2)
UROBILINOGEN, URINE: 0.2 E.U./DL
WBC # BLD: 8.1 K/UL (ref 4.8–10.8)

## 2020-01-30 ENCOUNTER — OFFICE VISIT (OUTPATIENT)
Dept: INTERNAL MEDICINE | Age: 45
End: 2020-01-30
Payer: COMMERCIAL

## 2020-01-30 VITALS
DIASTOLIC BLOOD PRESSURE: 95 MMHG | WEIGHT: 227 LBS | SYSTOLIC BLOOD PRESSURE: 145 MMHG | OXYGEN SATURATION: 98 % | BODY MASS INDEX: 36.48 KG/M2 | HEART RATE: 81 BPM | HEIGHT: 66 IN | RESPIRATION RATE: 18 BRPM

## 2020-01-30 PROCEDURE — 99396 PREV VISIT EST AGE 40-64: CPT | Performed by: INTERNAL MEDICINE

## 2020-01-30 RX ORDER — AMLODIPINE BESYLATE 5 MG/1
7.5 TABLET ORAL DAILY
Qty: 135 TABLET | Refills: 1 | Status: SHIPPED | OUTPATIENT
Start: 2020-01-30 | End: 2020-05-28 | Stop reason: SDUPTHER

## 2020-01-30 ASSESSMENT — ENCOUNTER SYMPTOMS
CONSTIPATION: 0
BLOOD IN STOOL: 0
NAUSEA: 0
VOICE CHANGE: 0
EYE PAIN: 0
VOMITING: 0
WHEEZING: 0
SINUS PRESSURE: 0
CHEST TIGHTNESS: 0
EYE REDNESS: 0
COUGH: 0
TROUBLE SWALLOWING: 0
ABDOMINAL PAIN: 0
SORE THROAT: 0
COLOR CHANGE: 0
DIARRHEA: 0

## 2020-01-30 NOTE — PROGRESS NOTES
Chief Complaint:   Miryam Steiner is a 41 y.o. female who presents forcomplete physical exam.    History of Present Illness:      Miryam Steiner is a 41 y.o. female who presents todayfor wellness visit AND follow up on her chronic medical conditions as noted below. She returns here today for blood pressure follow-up    She currently is taking amlodipine 5 mg daily and valsartan 320/12.5 daily  She states her blood pressures at home have been in good range    Obesity  She has difficulty losing weight  Over last 1 year she has gained about 15 pounds    Patient Active Problem List    Diagnosis Date Noted    Essential hypertension 11/24/2017    Pure hypercholesterolemia 11/24/2017       No past medical history on file. No past surgical history on file. Current Outpatient Medications   Medication Sig Dispense Refill    valsartan-hydrochlorothiazide (DIOVAN-HCT) 320-12.5 MG per tablet Take 1 tablet by mouth daily 90 tablet 1    amLODIPine (NORVASC) 5 MG tablet Take 1 tablet by mouth daily 90 tablet 1    cloNIDine (CATAPRES) 0.1 MG tablet Take one bedtime (Patient not taking: Reported on 1/30/2020) 90 tablet 1     No current facility-administered medications for this visit. 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   Social Needs    Financial resource strain: None    Food insecurity:     Worry: None     Inability: None    Transportation needs:     Medical: None     Non-medical: None   Tobacco Use    Smoking status: Never Smoker    Smokeless tobacco: Never Used   Substance and Sexual Activity    Alcohol use:  Yes     Alcohol/week: 0.0 standard drinks    Drug use: No    Sexual activity: None   Lifestyle    Physical activity:     Days per week: None     Minutes per session: None    Stress: None   Relationships    Social connections:     Talks on phone: None     Gets together: None     Attends Confucianism service: None     Active member of club or organization: None     Attends meetings of clubs or organizations: None     Relationship status: None    Intimate partner violence:     Fear of current or ex partner: None     Emotionally abused: None     Physically abused: None     Forced sexual activity: None   Other Topics Concern    None   Social History Narrative    Son born 2009    Daughter 2016     Family History   Problem Relation Age of Onset    Heart Disease Paternal Grandmother     Stroke Paternal Grandmother         No past surgical history on file.       Lab Review   Orders Only on 01/23/2020   Component Date Value    Color, UA 01/23/2020 YELLOW     Clarity, UA 01/23/2020 Clear     Glucose, Ur 01/23/2020 Negative     Bilirubin Urine 01/23/2020 Negative     Ketones, Urine 01/23/2020 Negative     Specific Gravity, UA 01/23/2020 1.014     Blood, Urine 01/23/2020 Negative     pH, UA 01/23/2020 7.5     Protein, UA 01/23/2020 Negative     Urobilinogen, Urine 01/23/2020 0.2     Nitrite, Urine 01/23/2020 Negative     Leukocyte Esterase, Urine 01/23/2020 Negative     TSH 01/23/2020 1.580     Cholesterol, Total 01/23/2020 185     Triglycerides 01/23/2020 119     HDL 01/23/2020 56*    LDL Calculated 01/23/2020 105     Sodium 01/23/2020 140     Potassium 01/23/2020 3.9     Chloride 01/23/2020 101     CO2 01/23/2020 23     Anion Gap 01/23/2020 16     Glucose 01/23/2020 93     BUN 01/23/2020 11     CREATININE 01/23/2020 1.0*    GFR Non- 01/23/2020 >60     Calcium 01/23/2020 9.1     Total Protein 01/23/2020 7.4     Alb 01/23/2020 4.4     Total Bilirubin 01/23/2020 0.5     Alkaline Phosphatase 01/23/2020 47     ALT 01/23/2020 10     AST 01/23/2020 12     WBC 01/23/2020 8.1     RBC 01/23/2020 4.51     Hemoglobin 01/23/2020 13.1     Hematocrit 01/23/2020 40.3     MCV 01/23/2020 89.4     MCH 01/23/2020 29.0     MCHC 01/23/2020 32.5*    RDW 01/23/2020 11.9     Platelets 63/15/5375 288     MPV 01/23/2020 9.8     Neutrophils % 01/23/2020 50.7     Lymphocytes % 01/23/2020 40.4*    Monocytes % 01/23/2020 5.5     Eosinophils % 01/23/2020 2.7     Basophils % 01/23/2020 0.5     Neutrophils Absolute 01/23/2020 4.1     Immature Granulocytes # 01/23/2020 0.0     Lymphocytes Absolute 01/23/2020 3.3     Monocytes Absolute 01/23/2020 0.50     Eosinophils Absolute 01/23/2020 0.20     Basophils Absolute 01/23/2020 0.00          Review of Systems   Constitutional: Negative for chills, fatigue and fever. HENT: Negative for congestion, ear pain, postnasal drip, sinus pressure, sore throat, trouble swallowing and voice change. Eyes: Negative for pain, redness and visual disturbance. Respiratory: Negative for cough, chest tightness and wheezing. Cardiovascular: Negative for chest pain, palpitations and leg swelling. Gastrointestinal: Negative for abdominal pain, blood in stool, constipation, diarrhea, nausea and vomiting. Endocrine: Negative for polydipsia and polyuria. Genitourinary: Negative for dysuria, enuresis, flank pain, frequency and urgency. Musculoskeletal: Negative for arthralgias, gait problem and joint swelling. Skin: Negative for color change and rash. Neurological: Negative for dizziness, tremors, syncope, facial asymmetry, speech difficulty, weakness, numbness and headaches. Psychiatric/Behavioral: Negative for agitation, behavioral problems, confusion, sleep disturbance and suicidal ideas. The patient is not nervous/anxious.            Vitals:    01/30/20 0907 01/30/20 0926   BP: (!) 160/110 (!) 145/95   Site: Left Upper Arm    Position: Sitting    Cuff Size: Large Adult    Pulse: 81    Resp: 18    SpO2: 98%    Weight: 227 lb (103 kg)    Height: 5' 6\" (1.676 m)       Wt Readings from Last 3 Encounters: 01/30/20 227 lb (103 kg)   09/30/19 223 lb 12.8 oz (101.5 kg)   06/26/19 213 lb (96.6 kg)   Body mass index is 36.64 kg/m². BP Readings from Last 3 Encounters:   01/30/20 (!) 145/95   09/30/19 (!) 142/100   06/26/19 (!) 152/88       Physical Exam  Constitutional:       Appearance: She is well-developed. HENT:      Head: Normocephalic and atraumatic. Right Ear: External ear normal.      Left Ear: External ear normal.   Eyes:      General: No scleral icterus. Conjunctiva/sclera: Conjunctivae normal.      Pupils: Pupils are equal, round, and reactive to light. Neck:      Musculoskeletal: Normal range of motion and neck supple. Thyroid: No thyromegaly. Vascular: No JVD. Cardiovascular:      Rate and Rhythm: Normal rate and regular rhythm. Heart sounds: Normal heart sounds. No murmur. Pulmonary:      Effort: Pulmonary effort is normal. No respiratory distress. Breath sounds: Normal breath sounds. No wheezing. Chest:      Chest wall: No tenderness. Abdominal:      General: Bowel sounds are normal.      Palpations: Abdomen is soft. There is no mass. Tenderness: There is no abdominal tenderness. Musculoskeletal: Normal range of motion. General: No tenderness. Lymphadenopathy:      Cervical: No cervical adenopathy. Skin:     General: Skin is warm and dry. Findings: No erythema or rash. Neurological:      Mental Status: She is alert and oriented to person, place, and time. Cranial Nerves: No cranial nerve deficit. Coordination: Coordination normal.      Deep Tendon Reflexes: Reflexes are normal and symmetric.    Psychiatric:         Behavior: Behavior normal.           ASSESSMENT/PLAN  Annual physical  * pap 1/2019  * mammogram- schedule     Essential hypertension  Elevated blood pressure today-  Discussed with the patient  #1 check blood pressure twice daily at different times for 2 weeks and send me readings in 2 weeks  #2 cont valsartan 320/12.5  Increase amlodipine 7.5  Add clonidine 0.1 hs  # 3  Initiate exercise and diet program  States she has a gym at work and will try to exercise after work at least 3-4 times weekly  She also has started yoga program once weekly  Home weight today and in 2 weeks call call us with weight in 2 weeks       Pure hypercholesterolemia- her LDL has improved 105 (106)( 108) ( 142). Low-fat diet is recommended          Orders Placed This Encounter   Procedures    ANTONIETA DIGITAL SCREEN W OR WO CAD BILATERAL    HIV Screen    CBC Auto Differential    Comprehensive Metabolic Panel    Lipid Panel    Urinalysis    TSH without Reflex    Vitamin D 25 Hydroxy     New Prescriptions    No medications on file      There are no Patient Instructions on file for this visit. No follow-ups on file. EMR Dragon/transcription disclaimer:Significant part of this  encounter note is electronic transcription/translation of spoken language to printed text. The electronic translation of spoken language may beerroneous, or at times, nonsensical words or phrases may be inadvertently transcribed.  Although I have reviewed the note for such errors, some may still exist.

## 2020-03-23 RX ORDER — VALSARTAN AND HYDROCHLOROTHIAZIDE 320; 12.5 MG/1; MG/1
TABLET, FILM COATED ORAL
Qty: 90 TABLET | Refills: 0 | Status: SHIPPED | OUTPATIENT
Start: 2020-03-23 | End: 2020-05-28 | Stop reason: SDUPTHER

## 2020-03-30 RX ORDER — CLONIDINE HYDROCHLORIDE 0.1 MG/1
TABLET ORAL
Qty: 90 TABLET | Refills: 0 | Status: SHIPPED | OUTPATIENT
Start: 2020-03-30 | End: 2021-02-01 | Stop reason: SDUPTHER

## 2020-05-11 ENCOUNTER — HOSPITAL ENCOUNTER (OUTPATIENT)
Dept: WOMENS IMAGING | Age: 45
Discharge: HOME | End: 2020-05-11
Payer: COMMERCIAL

## 2020-05-11 PROCEDURE — 77063 BREAST TOMOSYNTHESIS BI: CPT

## 2020-05-28 RX ORDER — AMLODIPINE BESYLATE 5 MG/1
7.5 TABLET ORAL DAILY
Qty: 135 TABLET | Refills: 1 | Status: SHIPPED | OUTPATIENT
Start: 2020-05-28 | End: 2021-02-01 | Stop reason: SDUPTHER

## 2020-05-28 RX ORDER — VALSARTAN AND HYDROCHLOROTHIAZIDE 320; 12.5 MG/1; MG/1
1 TABLET, FILM COATED ORAL DAILY
Qty: 90 TABLET | Refills: 1 | Status: SHIPPED | OUTPATIENT
Start: 2020-05-28 | End: 2020-12-28

## 2020-12-28 RX ORDER — VALSARTAN AND HYDROCHLOROTHIAZIDE 320; 12.5 MG/1; MG/1
TABLET, FILM COATED ORAL
Qty: 90 TABLET | Refills: 0 | Status: SHIPPED | OUTPATIENT
Start: 2020-12-28 | End: 2021-02-01 | Stop reason: SDUPTHER

## 2021-01-26 DIAGNOSIS — Z12.31 ENCOUNTER FOR SCREENING MAMMOGRAM FOR BREAST CANCER: ICD-10-CM

## 2021-01-26 DIAGNOSIS — I10 ESSENTIAL HYPERTENSION: ICD-10-CM

## 2021-01-26 DIAGNOSIS — Z11.4 SCREENING FOR HIV (HUMAN IMMUNODEFICIENCY VIRUS): ICD-10-CM

## 2021-01-26 DIAGNOSIS — E78.00 PURE HYPERCHOLESTEROLEMIA: ICD-10-CM

## 2021-01-26 DIAGNOSIS — E55.9 VITAMIN D DEFICIENCY: ICD-10-CM

## 2021-01-26 DIAGNOSIS — Z00.00 ANNUAL PHYSICAL EXAM: ICD-10-CM

## 2021-01-26 DIAGNOSIS — E66.09 EXOGENOUS OBESITY: ICD-10-CM

## 2021-01-26 DIAGNOSIS — R03.0 ELEVATED BLOOD PRESSURE READING: ICD-10-CM

## 2021-01-26 LAB
ALBUMIN SERPL-MCNC: 4.4 G/DL (ref 3.5–5.2)
ALP BLD-CCNC: 47 U/L (ref 35–104)
ALT SERPL-CCNC: 8 U/L (ref 5–33)
ANION GAP SERPL CALCULATED.3IONS-SCNC: 9 MMOL/L (ref 7–19)
AST SERPL-CCNC: 10 U/L (ref 5–32)
BASOPHILS ABSOLUTE: 0 K/UL (ref 0–0.2)
BASOPHILS RELATIVE PERCENT: 0.4 % (ref 0–1)
BILIRUB SERPL-MCNC: 0.4 MG/DL (ref 0.2–1.2)
BILIRUBIN URINE: NEGATIVE
BLOOD, URINE: NEGATIVE
BUN BLDV-MCNC: 13 MG/DL (ref 6–20)
CALCIUM SERPL-MCNC: 9.4 MG/DL (ref 8.6–10)
CHLORIDE BLD-SCNC: 101 MMOL/L (ref 98–111)
CHOLESTEROL, TOTAL: 191 MG/DL (ref 160–199)
CLARITY: CLEAR
CO2: 29 MMOL/L (ref 22–29)
COLOR: YELLOW
CREAT SERPL-MCNC: 0.9 MG/DL (ref 0.5–0.9)
EOSINOPHILS ABSOLUTE: 0.3 K/UL (ref 0–0.6)
EOSINOPHILS RELATIVE PERCENT: 3.7 % (ref 0–5)
GFR AFRICAN AMERICAN: >59
GFR NON-AFRICAN AMERICAN: >60
GLUCOSE BLD-MCNC: 96 MG/DL (ref 74–109)
GLUCOSE URINE: NEGATIVE MG/DL
HCT VFR BLD CALC: 41.3 % (ref 37–47)
HDLC SERPL-MCNC: 64 MG/DL (ref 65–121)
HEMOGLOBIN: 13.4 G/DL (ref 12–16)
HIV-1 P24 AG: NORMAL
IMMATURE GRANULOCYTES #: 0 K/UL
KETONES, URINE: NEGATIVE MG/DL
LDL CHOLESTEROL CALCULATED: 108 MG/DL
LEUKOCYTE ESTERASE, URINE: NEGATIVE
LYMPHOCYTES ABSOLUTE: 3.2 K/UL (ref 1.1–4.5)
LYMPHOCYTES RELATIVE PERCENT: 46.3 % (ref 20–40)
MCH RBC QN AUTO: 28.9 PG (ref 27–31)
MCHC RBC AUTO-ENTMCNC: 32.4 G/DL (ref 33–37)
MCV RBC AUTO: 89 FL (ref 81–99)
MONOCYTES ABSOLUTE: 0.4 K/UL (ref 0–0.9)
MONOCYTES RELATIVE PERCENT: 5.6 % (ref 0–10)
NEUTROPHILS ABSOLUTE: 3.1 K/UL (ref 1.5–7.5)
NEUTROPHILS RELATIVE PERCENT: 43.9 % (ref 50–65)
NITRITE, URINE: NEGATIVE
PDW BLD-RTO: 11.9 % (ref 11.5–14.5)
PH UA: 7 (ref 5–8)
PLATELET # BLD: 313 K/UL (ref 130–400)
PMV BLD AUTO: 9.9 FL (ref 9.4–12.3)
POTASSIUM SERPL-SCNC: 4.1 MMOL/L (ref 3.5–5)
PROTEIN UA: NEGATIVE MG/DL
RAPID HIV 1&2: NORMAL
RBC # BLD: 4.64 M/UL (ref 4.2–5.4)
SODIUM BLD-SCNC: 139 MMOL/L (ref 136–145)
SPECIFIC GRAVITY UA: 1.01 (ref 1–1.03)
TOTAL PROTEIN: 7.3 G/DL (ref 6.6–8.7)
TRIGL SERPL-MCNC: 97 MG/DL (ref 0–149)
TSH SERPL DL<=0.05 MIU/L-ACNC: 1.44 UIU/ML (ref 0.27–4.2)
UROBILINOGEN, URINE: 0.2 E.U./DL
VITAMIN D 25-HYDROXY: 39.2 NG/ML
WBC # BLD: 7 K/UL (ref 4.8–10.8)

## 2021-02-01 ENCOUNTER — OFFICE VISIT (OUTPATIENT)
Dept: INTERNAL MEDICINE | Age: 46
End: 2021-02-01
Payer: COMMERCIAL

## 2021-02-01 VITALS
OXYGEN SATURATION: 97 % | DIASTOLIC BLOOD PRESSURE: 88 MMHG | SYSTOLIC BLOOD PRESSURE: 128 MMHG | RESPIRATION RATE: 18 BRPM | WEIGHT: 210 LBS | HEART RATE: 76 BPM | HEIGHT: 66 IN | BODY MASS INDEX: 33.75 KG/M2

## 2021-02-01 DIAGNOSIS — Z00.00 ANNUAL PHYSICAL EXAM: Primary | ICD-10-CM

## 2021-02-01 DIAGNOSIS — E55.9 VITAMIN D DEFICIENCY: ICD-10-CM

## 2021-02-01 DIAGNOSIS — Z01.419 PAP SMEAR, AS PART OF ROUTINE GYNECOLOGICAL EXAMINATION: ICD-10-CM

## 2021-02-01 DIAGNOSIS — Z12.31 ENCOUNTER FOR SCREENING MAMMOGRAM FOR BREAST CANCER: ICD-10-CM

## 2021-02-01 DIAGNOSIS — I10 ESSENTIAL HYPERTENSION: ICD-10-CM

## 2021-02-01 DIAGNOSIS — Z87.410 HISTORY OF CERVICAL DYSPLASIA: ICD-10-CM

## 2021-02-01 DIAGNOSIS — E78.00 PURE HYPERCHOLESTEROLEMIA: ICD-10-CM

## 2021-02-01 DIAGNOSIS — E66.09 EXOGENOUS OBESITY: ICD-10-CM

## 2021-02-01 PROCEDURE — 99396 PREV VISIT EST AGE 40-64: CPT | Performed by: INTERNAL MEDICINE

## 2021-02-01 RX ORDER — AMLODIPINE BESYLATE 5 MG/1
7.5 TABLET ORAL DAILY
Qty: 135 TABLET | Refills: 1 | Status: SHIPPED | OUTPATIENT
Start: 2021-02-01 | End: 2021-08-16

## 2021-02-01 RX ORDER — CLONIDINE HYDROCHLORIDE 0.1 MG/1
TABLET ORAL
Qty: 90 TABLET | Refills: 1 | Status: SHIPPED | OUTPATIENT
Start: 2021-02-01 | End: 2021-08-16

## 2021-02-01 RX ORDER — FEXOFENADINE HCL 180 MG/1
180 TABLET ORAL DAILY
COMMUNITY
End: 2021-06-23

## 2021-02-01 RX ORDER — VALSARTAN AND HYDROCHLOROTHIAZIDE 320; 12.5 MG/1; MG/1
TABLET, FILM COATED ORAL
Qty: 90 TABLET | Refills: 1 | Status: SHIPPED | OUTPATIENT
Start: 2021-02-01 | End: 2021-09-27

## 2021-02-01 SDOH — ECONOMIC STABILITY: INCOME INSECURITY: HOW HARD IS IT FOR YOU TO PAY FOR THE VERY BASICS LIKE FOOD, HOUSING, MEDICAL CARE, AND HEATING?: NOT HARD AT ALL

## 2021-02-01 SDOH — ECONOMIC STABILITY: TRANSPORTATION INSECURITY
IN THE PAST 12 MONTHS, HAS LACK OF TRANSPORTATION KEPT YOU FROM MEETINGS, WORK, OR FROM GETTING THINGS NEEDED FOR DAILY LIVING?: NO

## 2021-02-01 ASSESSMENT — ENCOUNTER SYMPTOMS
NAUSEA: 0
CONSTIPATION: 0
VOICE CHANGE: 0
DIARRHEA: 0
EYE PAIN: 0
COLOR CHANGE: 0
CHEST TIGHTNESS: 0
WHEEZING: 0
SORE THROAT: 0
ABDOMINAL PAIN: 0
EYE REDNESS: 0
BLOOD IN STOOL: 0
COUGH: 0
SINUS PRESSURE: 0
VOMITING: 0
TROUBLE SWALLOWING: 0

## 2021-02-01 ASSESSMENT — PATIENT HEALTH QUESTIONNAIRE - PHQ9
1. LITTLE INTEREST OR PLEASURE IN DOING THINGS: 0
2. FEELING DOWN, DEPRESSED OR HOPELESS: 0

## 2021-02-01 NOTE — PROGRESS NOTES
Chief Complaint   Patient presents with    Annual Exam     History of presenting illness:  Miky Gotti is a38 y.o. female who presents today for follow up on her chronic medical conditions as noted below. Patient Active Problem List    Diagnosis Date Noted    Essential hypertension 11/24/2017    Pure hypercholesterolemia 11/24/2017     No past medical history on file. No past surgical history on file. Current Outpatient Medications   Medication Sig Dispense Refill    fexofenadine (ALLEGRA ALLERGY) 180 MG tablet Take 180 mg by mouth daily      amLODIPine (NORVASC) 5 MG tablet Take 1.5 tablets by mouth daily 135 tablet 1    cloNIDine (CATAPRES) 0.1 MG tablet TAKE ONE TABLET BY MOUTH EVERY NIGHT AT BEDTIME 90 tablet 1    valsartan-hydrochlorothiazide (DIOVAN-HCT) 320-12.5 MG per tablet TAKE ONE TABLET BY MOUTH DAILY 90 tablet 1     No current facility-administered medications for this visit. Allergies   Allergen Reactions    Latex      Social History     Tobacco Use    Smoking status: Never Smoker    Smokeless tobacco: Never Used   Substance Use Topics    Alcohol use: Yes     Alcohol/week: 0.0 standard drinks      Family History   Problem Relation Age of Onset    Heart Disease Paternal Grandmother     Stroke Paternal Grandmother        Review of Systems   Constitutional: Negative for chills, fatigue and fever. HENT: Negative for congestion, ear pain, postnasal drip, sinus pressure, sore throat, trouble swallowing and voice change. Eyes: Negative for pain, redness and visual disturbance. Respiratory: Negative for cough, chest tightness and wheezing. Cardiovascular: Negative for chest pain, palpitations and leg swelling. Gastrointestinal: Negative for abdominal pain, blood in stool, constipation, diarrhea, nausea and vomiting. Endocrine: Negative for polydipsia and polyuria. Genitourinary: Negative for dysuria, enuresis, flank pain, frequency and urgency. Musculoskeletal: Negative for arthralgias, gait problem and joint swelling. Skin: Negative for color change and rash. Neurological: Negative for dizziness, tremors, syncope, facial asymmetry, speech difficulty, weakness, numbness and headaches. Psychiatric/Behavioral: Negative for agitation, behavioral problems, confusion, sleep disturbance and suicidal ideas. The patient is not nervous/anxious. Vitals:    02/01/21 0840   BP: 128/88   Site: Left Upper Arm   Position: Sitting   Cuff Size: Large Adult   Pulse: 76   Resp: 18   SpO2: 97%   Weight: 210 lb (95.3 kg)   Height: 5' 6\" (1.676 m)     Body mass index is 33.89 kg/m². Physical Exam  Constitutional:       General: She is not in acute distress. Appearance: She is well-developed. She is not diaphoretic. HENT:      Head: Normocephalic and atraumatic. Nose: Nose normal.   Eyes:      General: No scleral icterus. Right eye: No discharge. Left eye: No discharge. Conjunctiva/sclera: Conjunctivae normal.      Pupils: Pupils are equal, round, and reactive to light. Neck:      Musculoskeletal: Normal range of motion. Thyroid: No thyromegaly. Vascular: No JVD. Cardiovascular:      Rate and Rhythm: Normal rate and regular rhythm. Heart sounds: No murmur. Pulmonary:      Breath sounds: Normal breath sounds. No wheezing or rales. Chest:      Chest wall: No tenderness. Abdominal:      General: Bowel sounds are normal. There is no distension. Tenderness: There is no guarding. Musculoskeletal:      Comments: Induced pain on palpation over paracervical  muscles  ROM with neck flexion/ extension  Limited  Upper extremity muscle strength and reflexes are normal     Lymphadenopathy:      Cervical: No cervical adenopathy. Skin:     General: Skin is dry. Findings: No erythema or rash. Neurological:      Mental Status: She is oriented to person, place, and time. Cranial Nerves: No cranial nerve deficit. Coordination: Coordination normal.      Deep Tendon Reflexes: Reflexes are normal and symmetric. Psychiatric:         Behavior: Behavior normal.         Thought Content:  Thought content normal.         Judgment: Judgment normal.         Lab Review   Orders Only on 01/26/2021   Component Date Value    Vit D, 25-Hydroxy 01/26/2021 39.2     TSH 01/26/2021 1.440     Color, UA 01/26/2021 YELLOW     Clarity, UA 01/26/2021 Clear     Glucose, Ur 01/26/2021 Negative     Bilirubin Urine 01/26/2021 Negative     Ketones, Urine 01/26/2021 Negative     Specific Gravity, UA 01/26/2021 1.009     Blood, Urine 01/26/2021 Negative     pH, UA 01/26/2021 7.0     Protein, UA 01/26/2021 Negative     Urobilinogen, Urine 01/26/2021 0.2     Nitrite, Urine 01/26/2021 Negative     Leukocyte Esterase, Urine 01/26/2021 Negative     Cholesterol, Total 01/26/2021 191     Triglycerides 01/26/2021 97     HDL 01/26/2021 64*    LDL Calculated 01/26/2021 108     Sodium 01/26/2021 139     Potassium 01/26/2021 4.1     Chloride 01/26/2021 101     CO2 01/26/2021 29     Anion Gap 01/26/2021 9     Glucose 01/26/2021 96     BUN 01/26/2021 13     CREATININE 01/26/2021 0.9     GFR Non- 01/26/2021 >60     GFR  01/26/2021 >59     Calcium 01/26/2021 9.4     Total Protein 01/26/2021 7.3     Albumin 01/26/2021 4.4     Total Bilirubin 01/26/2021 0.4     Alkaline Phosphatase 01/26/2021 47     ALT 01/26/2021 8     AST 01/26/2021 10     WBC 01/26/2021 7.0     RBC 01/26/2021 4.64     Hemoglobin 01/26/2021 13.4     Hematocrit 01/26/2021 41.3     MCV 01/26/2021 89.0     MCH 01/26/2021 28.9     MCHC 01/26/2021 32.4*    RDW 01/26/2021 11.9     Platelets 21/43/9102 313     MPV 01/26/2021 9.9     Neutrophils % 01/26/2021 43.9*    Lymphocytes % 01/26/2021 46.3*    Monocytes % 01/26/2021 5.6     Eosinophils % 01/26/2021 3.7  Basophils % 01/26/2021 0.4     Neutrophils Absolute 01/26/2021 3.1     Immature Granulocytes # 01/26/2021 0.0     Lymphocytes Absolute 01/26/2021 3.2     Monocytes Absolute 01/26/2021 0.40     Eosinophils Absolute 01/26/2021 0.30     Basophils Absolute 01/26/2021 0.00     Rapid HIV 1&2 01/26/2021 Non-reactive     HIV-1 P24 Ag 01/26/2021 Non-reactive            ASSESSMENT/PLAN:    Annual physical  * pap 1/2019/history of mild cervical dysplasia  Now her gynecologist has moved from this area  Referral for [address] [place] [address] placed  * mammogram- schedule     Essential hypertension  Pressure readings reviewed  Lab results reviewed  Renal function is normal  Rx  Amlodipine 5 mg daily  Clonidine 0.1 bedtime  Diovan /12.5 daily  Diet  Exercise routine  Continue weight loss/has lost 10 pounds over last 1 year        Pure hypercholesterolemia-   her  ( 105 (106)( 108) ( 142). Low-fat diet is recommended    Neck pain  At times certain positions cause numbness in hands  1. Neck exercise  Use topical Voltaren  3. Local heat  4. Massage therapy    If sx not improving and resolving , if sx continue or re-occur pt has been instructed to call us and / or return here for follow- up evaluation              Orders Placed This Encounter   Procedures    ANTONIETA DIGITAL SCREEN W OR WO CAD BILATERAL    Hepatitis C Antibody    CBC Auto Differential    Comprehensive Metabolic Panel    Lipid Panel    Urinalysis    TSH without Reflex    Vitamin D 25 Hydroxy    Adilia De Jesus NP, OB/GYN, [facility], [facility]     New Prescriptions    No medications on file         Return in about 1 year (around 2/1/2022) for Annual Physical.   There are no Patient Instructions on file for this visit. MICHAEL Dragon/transcription disclaimer:Significant part of this  encounter note is electronic transcription/translationof spoken language to printed text. The electronic translation of spoken language may be erroneous, or at times, nonsensical words or phrases may be inadvertently transcribed.  Although I have reviewed the note for sucherrors, some may still exist.

## 2021-06-23 ENCOUNTER — OFFICE VISIT (OUTPATIENT)
Dept: OBGYN CLINIC | Age: 46
End: 2021-06-23
Payer: COMMERCIAL

## 2021-06-23 VITALS
BODY MASS INDEX: 32.78 KG/M2 | SYSTOLIC BLOOD PRESSURE: 146 MMHG | DIASTOLIC BLOOD PRESSURE: 112 MMHG | WEIGHT: 204 LBS | HEIGHT: 66 IN | HEART RATE: 80 BPM

## 2021-06-23 DIAGNOSIS — Z12.4 SCREENING FOR CERVICAL CANCER: ICD-10-CM

## 2021-06-23 DIAGNOSIS — Z11.51 SCREENING FOR HPV (HUMAN PAPILLOMAVIRUS): ICD-10-CM

## 2021-06-23 DIAGNOSIS — Z12.31 SCREENING MAMMOGRAM, ENCOUNTER FOR: ICD-10-CM

## 2021-06-23 DIAGNOSIS — Z98.890 HISTORY OF LOOP ELECTRICAL EXCISION PROCEDURE (LEEP): ICD-10-CM

## 2021-06-23 DIAGNOSIS — Z01.419 WELL WOMAN EXAM: Primary | ICD-10-CM

## 2021-06-23 PROCEDURE — 99386 PREV VISIT NEW AGE 40-64: CPT | Performed by: NURSE PRACTITIONER

## 2021-06-23 ASSESSMENT — ENCOUNTER SYMPTOMS
EYES NEGATIVE: 1
RESPIRATORY NEGATIVE: 1
GASTROINTESTINAL NEGATIVE: 1

## 2021-06-23 NOTE — PROGRESS NOTES
Anette Moreno is a 40 y.o. female who presents today for her medical conditions/ complaints as noted below. Odalys VERA Juan is c/o of Establish Care and Annual Exam        HPI  Pt presents today as a new patient for pap smear and breast exam.  Hx of LEEP. Last mammogram:  05/2020  Last pap smear:  Dr YOUSSEF about 2 years ago  Contraception:  never  [place]:  2  Para:  2  AB:  0  Last bone density:  never  Last colonoscopy: never    Patient's last menstrual period was 06/04/2021 (exact date). No obstetric history on file. History reviewed. No pertinent past medical history. History reviewed. No pertinent surgical history. Family History   Problem Relation Age of Onset    Heart Disease Paternal Grandmother     Stroke Paternal Grandmother      Social History     Tobacco Use    Smoking status: Never Smoker    Smokeless tobacco: Never Used   Substance Use Topics    Alcohol use: Yes     Alcohol/week: 0.0 standard drinks       Current Outpatient Medications   Medication Sig Dispense Refill    amLODIPine (NORVASC) 5 MG tablet Take 1.5 tablets by mouth daily 135 tablet 1    cloNIDine (CATAPRES) 0.1 MG tablet TAKE ONE TABLET BY MOUTH EVERY NIGHT AT BEDTIME 90 tablet 1    valsartan-hydrochlorothiazide (DIOVAN-HCT) 320-12.5 MG per tablet TAKE ONE TABLET BY MOUTH DAILY 90 tablet 1     No current facility-administered medications for this visit. Allergies   Allergen Reactions    Latex      Vitals:    06/23/21 1604   BP: (!) 146/112   Pulse: 80     Body mass index is 32.93 kg/m². Review of Systems   Constitutional: Negative. HENT: Negative. Eyes: Negative. Respiratory: Negative. Cardiovascular: Negative. Gastrointestinal: Negative. Genitourinary: Negative for difficulty urinating, dyspareunia, dysuria, enuresis, frequency, hematuria, menstrual problem, pelvic pain, urgency and vaginal discharge. Musculoskeletal: Negative. Skin: Negative. Neurological: Negative.     Psychiatric/Behavioral: Negative. Physical Exam  Vitals and nursing note reviewed. Constitutional:       General: She is not in acute distress. Appearance: She is well-developed. She is not diaphoretic. HENT:      Head: Normocephalic and atraumatic. Right Ear: External ear normal.      Left Ear: External ear normal.      Nose: Nose normal.   Eyes:      General: Lids are normal.         Right eye: No discharge. Left eye: No discharge. Conjunctiva/sclera: Conjunctivae normal.      Pupils: Pupils are equal, round, and reactive to light. Neck:      Thyroid: No thyroid mass or thyromegaly. Trachea: No tracheal deviation. Cardiovascular:      Rate and Rhythm: Normal rate and regular rhythm. Heart sounds: Normal heart sounds. No murmur heard. Pulmonary:      Effort: Pulmonary effort is normal.      Breath sounds: Normal breath sounds. No wheezing. Chest:      Breasts: Breasts are symmetrical.         Right: No inverted nipple, mass, nipple discharge, skin change or tenderness. Left: No inverted nipple, mass, nipple discharge, skin change or tenderness. Abdominal:      General: Bowel sounds are normal. There is no distension. Palpations: Abdomen is soft. There is no mass. Tenderness: There is no abdominal tenderness. Hernia: There is no hernia in the left inguinal area. Genitourinary:     Labia:         Right: No rash, tenderness or lesion. Left: No rash, tenderness or lesion. Vagina: No vaginal discharge or tenderness. Cervix: No cervical motion tenderness or discharge (normal cervical mucosa). Uterus: Not enlarged and not tender. Adnexa:         Right: No mass, tenderness or fullness. Left: No mass, tenderness or fullness. Rectum: No external hemorrhoid. Comments: Pap collected for cervical cytology  Musculoskeletal:         General: No tenderness. Normal range of motion.       Cervical back: Normal range of motion and neck supple. Lymphadenopathy:      Cervical:      Right cervical: No superficial, deep or posterior cervical adenopathy. Left cervical: No superficial, deep or posterior cervical adenopathy. Upper Body:      Right upper body: No supraclavicular, pectoral or epitrochlear adenopathy. Left upper body: No supraclavicular, pectoral or epitrochlear adenopathy. Skin:     General: Skin is warm and dry. Findings: No rash. Neurological:      Mental Status: She is alert and oriented to person, place, and time. She is not disoriented. Sensory: No sensory deficit. Coordination: Coordination normal.      Gait: Gait normal.      Deep Tendon Reflexes: Reflexes are normal and symmetric. Psychiatric:         Speech: Speech normal.         Behavior: Behavior normal.         Thought Content: Thought content normal.         Judgment: Judgment normal.          Diagnosis Orders   1. Well woman exam  PAP SMEAR    Human papillomavirus (HPV) DNA probe thin prep high risk   2. Screening for cervical cancer  PAP SMEAR   3. Screening for HPV (human papillomavirus)  Human papillomavirus (HPV) DNA probe thin prep high risk   4. Screening mammogram, encounter for     5. History of loop electrical excision procedure (LEEP)         MEDICATIONS:  No orders of the defined types were placed in this encounter. ORDERS:  Orders Placed This Encounter   Procedures    PAP SMEAR    Human papillomavirus (HPV) DNA probe thin prep high risk       PLAN:  Pap collected  RTC in 1 year or prn  Patient Instructions     Patient Education        Breast Self-Exam: Care Instructions  Your Care Instructions     A breast self-exam is when you check your breasts for lumps or changes. This regular exam helps you learn how your breasts normally look and feel. Most breast problems or changes are not because of cancer.   Breast self-exam is not a substitute for a mammogram. Having regular breast exams by your doctor and regular mammograms improve your chances of finding any problems with your breasts. Some women set a time each month to do a step-by-step breast self-exam. Other women like a less formal system. They might look at their breasts as they brush their teeth, or feel their breasts once in a while in the shower. If you notice a change in your breast, tell your doctor. Follow-up care is a key part of your treatment and safety. Be sure to make and go to all appointments, and call your doctor if you are having problems. It's also a good idea to know your test results and keep a list of the medicines you take. How do you do a breast self-exam?  · The best time to examine your breasts is usually one week after your menstrual period begins. Your breasts should not be tender then. If you do not have periods, you might do your exam on a day of the month that is easy to remember. · To examine your breasts:  ? Remove all your clothes above the waist and lie down. When you are lying down, your breast tissue spreads evenly over your chest wall, which makes it easier to feel all your breast tissue. ? Use the padsnot the fingertipsof the 3 middle fingers of your left hand to check your right breast. Move your fingers slowly in small coin-sized circles that overlap. ? Use three levels of pressure to feel of all your breast tissue. Use light pressure to feel the tissue close to the skin surface. Use medium pressure to feel a little deeper. Use firm pressure to feel your tissue close to your breastbone and ribs. Use each pressure level to feel your breast tissue before moving on to the next spot. ? Check your entire breast, moving up and down as if following a strip from the collarbone to the bra line, and from the armpit to the ribs. Repeat until you have covered the entire breast.  ? Repeat this procedure for your left breast, using the pads of the 3 middle fingers of your right hand. · To examine your breasts while in the shower:  ? Place one arm over your head and lightly soap your breast on that side. ? Using the pads of your fingers, gently move your hand over your breast (in the strip pattern described above), feeling carefully for any lumps or changes. ? Repeat for the other breast.  · Have your doctor inspect anything you notice to see if you need further testing. Where can you learn more? Go to https://chpepiceweb.Thinking Screen Media. org and sign in to your [employer] account. Enter P148 in the [facility] box to learn more about \"Breast Self-Exam: Care Instructions. \"     If you do not have an account, please click on the \"Sign Up Now\" link. Current as of: December 17, 2020               Content Version: 12.9  © 2006-2021 Healthwise, DragonWave. Care instructions adapted under license by [facility] ([facility]). If you have questions about a medical condition or this instruction, always ask your healthcare professional. [place] [address] any warranty or liability for your use of this information. Patient Education        Well Visit, Ages 25 to 48: Care Instructions  Overview     Well visits can help you stay healthy. Your doctor has checked your overall health and may have suggested ways to take good care of yourself. Your doctor also may have recommended tests. At home, you can help prevent illness with healthy eating, regular exercise, and other steps. Follow-up care is a key part of your treatment and safety. Be sure to make and go to all appointments, and call your doctor if you are having problems. It's also a good idea to know your test results and keep a list of the medicines you take. How can you care for yourself at home? · Get screening tests that you and your doctor decide on. Screening helps find diseases before any symptoms appear. · Eat healthy foods. Choose fruits, vegetables, whole grains, protein, and low-fat dairy foods. Limit fat, especially saturated fat. Reduce salt in your diet.   · Limit alcohol. If you are a man, have no more than 2 drinks a day or 14 drinks a week. If you are a woman, have no more than 1 drink a day or 7 drinks a week. · Get at least 30 minutes of physical activity on most days of the week. Walking is a good choice. You also may want to do other activities, such as running, swimming, cycling, or playing tennis or team sports. Discuss any changes in your exercise program with your doctor. · Reach and stay at a healthy weight. This will lower your risk for many problems, such as obesity, diabetes, heart disease, and high blood pressure. · Do not smoke or allow others to smoke around you. If you need help quitting, talk to your doctor about stop-smoking programs and medicines. These can increase your chances of quitting for good. · Care for your mental health. It is easy to get weighed down by worry and stress. Learn strategies to manage stress, like deep breathing and mindfulness, and stay connected with your family and community. If you find you often feel sad or hopeless, talk with your doctor. Treatment can help. · Talk to your doctor about whether you have any risk factors for sexually transmitted infections (STIs). You can help prevent STIs if you wait to have sex with a new partner (or partners) until you've each been tested for STIs. It also helps if you use condoms (male or female condoms) and if you limit your sex partners to one person who only has sex with you. Vaccines are available for some STIs, such as HPV. · Use birth control if it's important to you to prevent pregnancy. Talk with your doctor about the choices available and what might be best for you. · If you think you may have a problem with alcohol or drug use, talk to your doctor. This includes prescription medicines (such as amphetamines and opioids) and illegal drugs (such as cocaine and methamphetamine). Your doctor can help you figure out what type of treatment is best for you.   · Protect your skin from too much sun. When you're outdoors from 10 a.m. to 4 p.m., stay in the shade or cover up with clothing and a hat with a wide brim. Wear sunglasses that block UV rays. Even when it's cloudy, put broad-spectrum sunscreen (SPF 30 or higher) on any exposed skin. · See a dentist one or two times a year for checkups and to have your teeth cleaned. · Wear a seat belt in the car. When should you call for help? Watch closely for changes in your health, and be sure to contact your doctor if you have any problems or symptoms that concern you. Where can you learn more? Go to https://Venmopepiceweb.[employer]. org and sign in to your [employer] account. Enter P072 in the [employer] box to learn more about \"Well Visit, Ages 25 to 48: Care Instructions. \"     If you do not have an account, please click on the \"Sign Up Now\" link. Current as of: May 27, 2020               Content Version: 12.9  © 2006-2021 Clinithink. Care instructions adapted under license by [facility] ([facility]). If you have questions about a medical condition or this instruction, always ask your healthcare professional. [address] any warranty or liability for your use of this information. Patient Education        Human Papillomavirus (HPV): Care Instructions  Overview  The human papillomavirus (HPV) is a very common virus. There are many types of HPV. Some types cause the common skin wart. Other types cause genital warts, which can be spread by sexual contact. Some types can increase the risk for certain cancers, such as cervical or anal cancer. Having one type of HPV doesn't lead to having another type. Many women who have HPV may not know that they're infected until it's found with a cervical cancer screening test, such as an HPV test.  If an HPV screening test finds that you have the types of HPV that might lead to cancer, your doctor may suggest more tests.  This doesn't mean you'll get cancer. But it means that you may have an increased risk. Abnormal cell changes caused by HPV often go away on their own. If they don't, they can be treated. Follow-up care is a key part of your treatment and safety. Be sure to make and go to all appointments, and call your doctor if you are having problems. It's also a good idea to know your test results and keep a list of the medicines you take. How can you care for yourself at home? · Do not smoke. Smoking increases the risk for cervical problems and cervical cancer. If you need help quitting, talk to your doctor about stop-smoking programs and medicines. These can increase your chances of quitting for good. · Use condoms every time you have sex. Use them from the beginning to the end of sexual contact. · Be sure to tell your sexual partner or partners that you have HPV. Even if you do not have symptoms, you can still pass HPV to others. · Having one sex partner (who does not have STIs and does not have sex with anyone else) can decrease your risk of getting STIs. When should you call for help? Watch closely for changes in your health, and be sure to contact your doctor if:    · You have vaginal pain during or after sex.     · You have vaginal bleeding when you are not in your menstrual period. Where can you learn more? Go to https://OokbeepeTriton.healthBlizuu. org and sign in to your [employer] Systems account. Enter F690 in the [facility] box to learn more about \"Human Papillomavirus (HPV): Care Instructions. \"     If you do not have an account, please click on the \"Sign Up Now\" link. Current as of: February 11, 2021               Content Version: 12.9  © 5165-3814 Healthwise, SupplyBetter. Care instructions adapted under license by [facility] ([facility]).  If you have questions about a medical condition or this instruction, always ask your healthcare professional. [address] any warranty or liability for your use of this information.

## 2021-06-23 NOTE — PATIENT INSTRUCTIONS
Patient Education        Breast Self-Exam: Care Instructions  Your Care Instructions     A breast self-exam is when you check your breasts for lumps or changes. This regular exam helps you learn how your breasts normally look and feel. Most breast problems or changes are not because of cancer. Breast self-exam is not a substitute for a mammogram. Having regular breast exams by your doctor and regular mammograms improve your chances of finding any problems with your breasts. Some women set a time each month to do a step-by-step breast self-exam. Other women like a less formal system. They might look at their breasts as they brush their teeth, or feel their breasts once in a while in the shower. If you notice a change in your breast, tell your doctor. Follow-up care is a key part of your treatment and safety. Be sure to make and go to all appointments, and call your doctor if you are having problems. It's also a good idea to know your test results and keep a list of the medicines you take. How do you do a breast self-exam?  · The best time to examine your breasts is usually one week after your menstrual period begins. Your breasts should not be tender then. If you do not have periods, you might do your exam on a day of the month that is easy to remember. · To examine your breasts:  ? Remove all your clothes above the waist and lie down. When you are lying down, your breast tissue spreads evenly over your chest wall, which makes it easier to feel all your breast tissue. ? Use the padsnot the fingertipsof the 3 middle fingers of your left hand to check your right breast. Move your fingers slowly in small coin-sized circles that overlap. ? Use three levels of pressure to feel of all your breast tissue. Use light pressure to feel the tissue close to the skin surface. Use medium pressure to feel a little deeper. Use firm pressure to feel your tissue close to your breastbone and ribs.  Use each pressure level to feel your breast tissue before moving on to the next spot. ? Check your entire breast, moving up and down as if following a strip from the collarbone to the bra line, and from the armpit to the ribs. Repeat until you have covered the entire breast.  ? Repeat this procedure for your left breast, using the pads of the 3 middle fingers of your right hand. · To examine your breasts while in the shower:  ? Place one arm over your head and lightly soap your breast on that side. ? Using the pads of your fingers, gently move your hand over your breast (in the strip pattern described above), feeling carefully for any lumps or changes. ? Repeat for the other breast.  · Have your doctor inspect anything you notice to see if you need further testing. Where can you learn more? Go to https://chjaseneb.[employer]. org and sign in to your [facility] account. Enter P148 in the [employer] box to learn more about \"Breast Self-Exam: Care Instructions. \"     If you do not have an account, please click on the \"Sign Up Now\" link. Current as of: December 17, 2020               Content Version: 12.9  © 7178-5038 [employer]. Care instructions adapted under license by [facility] ([facility]). If you have questions about a medical condition or this instruction, always ask your healthcare professional. [place] [address] any warranty or liability for your use of this information. Patient Education        Well Visit, Ages 25 to 48: Care Instructions  Overview     Well visits can help you stay healthy. Your doctor has checked your overall health and may have suggested ways to take good care of yourself. Your doctor also may have recommended tests. At home, you can help prevent illness with healthy eating, regular exercise, and other steps. Follow-up care is a key part of your treatment and safety. Be sure to make and go to all appointments, and call your doctor if you are having problems.  It's also a good idea to know your test results and keep a list of the medicines you take. How can you care for yourself at home? · Get screening tests that you and your doctor decide on. Screening helps find diseases before any symptoms appear. · Eat healthy foods. Choose fruits, vegetables, whole grains, protein, and low-fat dairy foods. Limit fat, especially saturated fat. Reduce salt in your diet. · Limit alcohol. If you are a man, have no more than 2 drinks a day or 14 drinks a week. If you are a woman, have no more than 1 drink a day or 7 drinks a week. · Get at least 30 minutes of physical activity on most days of the week. Walking is a good choice. You also may want to do other activities, such as running, swimming, cycling, or playing tennis or team sports. Discuss any changes in your exercise program with your doctor. · Reach and stay at a healthy weight. This will lower your risk for many problems, such as obesity, diabetes, heart disease, and high blood pressure. · Do not smoke or allow others to smoke around you. If you need help quitting, talk to your doctor about stop-smoking programs and medicines. These can increase your chances of quitting for good. · Care for your mental health. It is easy to get weighed down by worry and stress. Learn strategies to manage stress, like deep breathing and mindfulness, and stay connected with your family and community. If you find you often feel sad or hopeless, talk with your doctor. Treatment can help. · Talk to your doctor about whether you have any risk factors for sexually transmitted infections (STIs). You can help prevent STIs if you wait to have sex with a new partner (or partners) until you've each been tested for STIs. It also helps if you use condoms (male or female condoms) and if you limit your sex partners to one person who only has sex with you. Vaccines are available for some STIs, such as HPV.   · Use birth control if it's important to you to prevent pregnancy. Talk with your doctor about the choices available and what might be best for you. · If you think you may have a problem with alcohol or drug use, talk to your doctor. This includes prescription medicines (such as amphetamines and opioids) and illegal drugs (such as cocaine and methamphetamine). Your doctor can help you figure out what type of treatment is best for you. · Protect your skin from too much sun. When you're outdoors from 10 a.m. to 4 p.m., stay in the shade or cover up with clothing and a hat with a wide brim. Wear sunglasses that block UV rays. Even when it's cloudy, put broad-spectrum sunscreen (SPF 30 or higher) on any exposed skin. · See a dentist one or two times a year for checkups and to have your teeth cleaned. · Wear a seat belt in the car. When should you call for help? Watch closely for changes in your health, and be sure to contact your doctor if you have any problems or symptoms that concern you. Where can you learn more? Go to https://[employer]. org and sign in to your [employer] account. Enter P072 in the [facility] box to learn more about \"Well Visit, Ages 25 to 48: Care Instructions. \"     If you do not have an account, please click on the \"Sign Up Now\" link. Current as of: May 27, 2020               Content Version: 12.9  © 2006-2021 [employer]. Care instructions adapted under license by [facility] ([facility]). If you have questions about a medical condition or this instruction, always ask your healthcare professional. [place] [address] any warranty or liability for your use of this information. Patient Education        Human Papillomavirus (HPV): Care Instructions  Overview  The human papillomavirus (HPV) is a very common virus. There are many types of HPV. Some types cause the common skin wart. Other types cause genital warts, which can be spread by sexual contact.  Some types can increase the risk for certain cancers, such as cervical or anal cancer. Having one type of HPV doesn't lead to having another type. Many women who have HPV may not know that they're infected until it's found with a cervical cancer screening test, such as an HPV test.  If an HPV screening test finds that you have the types of HPV that might lead to cancer, your doctor may suggest more tests. This doesn't mean you'll get cancer. But it means that you may have an increased risk. Abnormal cell changes caused by HPV often go away on their own. If they don't, they can be treated. Follow-up care is a key part of your treatment and safety. Be sure to make and go to all appointments, and call your doctor if you are having problems. It's also a good idea to know your test results and keep a list of the medicines you take. How can you care for yourself at home? · Do not smoke. Smoking increases the risk for cervical problems and cervical cancer. If you need help quitting, talk to your doctor about stop-smoking programs and medicines. These can increase your chances of quitting for good. · Use condoms every time you have sex. Use them from the beginning to the end of sexual contact. · Be sure to tell your sexual partner or partners that you have HPV. Even if you do not have symptoms, you can still pass HPV to others. · Having one sex partner (who does not have STIs and does not have sex with anyone else) can decrease your risk of getting STIs. When should you call for help? Watch closely for changes in your health, and be sure to contact your doctor if:    · You have vaginal pain during or after sex.     · You have vaginal bleeding when you are not in your menstrual period. Where can you learn more? Go to https://maria isabel.[facility]. org and sign in to your [employer] account. Enter F690 in the [employer] box to learn more about \"Human Papillomavirus (HPV): Care Instructions. \"     If you do not have an account, please click on the \"Sign Up Now\" link. Current as of: February 11, 2021               Content Version: 12.9  © 2006-2021 Healthwise[employer] Incorporated. Care instructions adapted under license by [facility] ([facility]). If you have questions about a medical condition or this instruction, always ask your healthcare professional. [address] any warranty or liability for your use of this information.

## 2021-06-30 LAB
HPV COMMENT: NORMAL
HPV TYPE 16: NOT DETECTED
HPV TYPE 18: NOT DETECTED
HPVOH (OTHER TYPES): NOT DETECTED

## 2021-08-16 RX ORDER — AMLODIPINE BESYLATE 5 MG/1
TABLET ORAL
Qty: 135 TABLET | Refills: 1 | Status: SHIPPED | OUTPATIENT
Start: 2021-08-16 | End: 2022-02-18

## 2021-08-16 RX ORDER — CLONIDINE HYDROCHLORIDE 0.1 MG/1
TABLET ORAL
Qty: 90 TABLET | Refills: 1 | Status: SHIPPED | OUTPATIENT
Start: 2021-08-16 | End: 2022-02-18

## 2021-08-16 NOTE — TELEPHONE ENCOUNTER
Odalys called requesting a refill of the below medication which has been pended for you:     Requested Prescriptions     Pending Prescriptions Disp Refills    cloNIDine (CATAPRES) 0.1 MG tablet [Pharmacy Med Name: cloNIDine HCL 0.1MG TABLET] 90 tablet 1     Sig: TAKE ONE TABLET BY MOUTH EVERY NIGHT AT BEDTIME    amLODIPine (NORVASC) 5 MG tablet [Pharmacy Med Name: amLODIPine BESYLATE 5 MG TAB] 135 tablet 1     Sig: TAKE 1 AND 1/2 TABLET BY MOUTH DAILY       Last Appointment Date: 2/1/2021  Next Appointment Date: 2/2/2022    Allergies   Allergen Reactions    Latex

## 2021-09-27 RX ORDER — VALSARTAN AND HYDROCHLOROTHIAZIDE 320; 12.5 MG/1; MG/1
TABLET, FILM COATED ORAL
Qty: 90 TABLET | Refills: 1 | Status: SHIPPED | OUTPATIENT
Start: 2021-09-27 | End: 2022-03-15 | Stop reason: SDUPTHER

## 2021-09-27 NOTE — TELEPHONE ENCOUNTER
Odalys called requesting a refill of the below medication which has been pended for you:     Requested Prescriptions     Pending Prescriptions Disp Refills    valsartan-hydroCHLOROthiazide (DIOVAN-HCT) 320-12.5 MG per tablet [Pharmacy Med Name: VALSARTAN-HCTZ 320-12.5 MG TAB] 90 tablet 1     Sig: TAKE ONE TABLET BY MOUTH DAILY       Last Appointment Date: 2/1/2021  Next Appointment Date: 2/2/2022    Allergies   Allergen Reactions    Latex

## 2021-10-20 ENCOUNTER — OFFICE VISIT (OUTPATIENT)
Dept: INTERNAL MEDICINE | Age: 46
End: 2021-10-20
Payer: COMMERCIAL

## 2021-10-20 VITALS
HEIGHT: 66 IN | OXYGEN SATURATION: 98 % | DIASTOLIC BLOOD PRESSURE: 88 MMHG | RESPIRATION RATE: 18 BRPM | WEIGHT: 215 LBS | SYSTOLIC BLOOD PRESSURE: 138 MMHG | HEART RATE: 83 BPM | BODY MASS INDEX: 34.55 KG/M2

## 2021-10-20 DIAGNOSIS — G47.9 SLEEP DISTURBANCE: ICD-10-CM

## 2021-10-20 DIAGNOSIS — I10 ESSENTIAL HYPERTENSION: Primary | ICD-10-CM

## 2021-10-20 DIAGNOSIS — G47.33 OSA (OBSTRUCTIVE SLEEP APNEA): ICD-10-CM

## 2021-10-20 DIAGNOSIS — E66.09 EXOGENOUS OBESITY: ICD-10-CM

## 2021-10-20 PROCEDURE — 99213 OFFICE O/P EST LOW 20 MIN: CPT | Performed by: INTERNAL MEDICINE

## 2021-10-20 ASSESSMENT — ENCOUNTER SYMPTOMS
CHEST TIGHTNESS: 0
CONSTIPATION: 0
ABDOMINAL PAIN: 0
SORE THROAT: 0
WHEEZING: 0
COUGH: 0

## 2021-10-20 NOTE — PROGRESS NOTES
Chief Complaint   Patient presents with    Anxiety     History of presenting illness:  Gustavo Moran is a38 y.o. female who presents today for follow up on her chronic medical conditions as noted below. Patient Active Problem List    Diagnosis Date Noted    Essential hypertension 11/24/2017    Pure hypercholesterolemia 11/24/2017     No past medical history on file. No past surgical history on file. Current Outpatient Medications   Medication Sig Dispense Refill    valsartan-hydroCHLOROthiazide (DIOVAN-HCT) 320-12.5 MG per tablet TAKE ONE TABLET BY MOUTH DAILY 90 tablet 1    cloNIDine (CATAPRES) 0.1 MG tablet TAKE ONE TABLET BY MOUTH EVERY NIGHT AT BEDTIME 90 tablet 1    amLODIPine (NORVASC) 5 MG tablet TAKE 1 AND 1/2 TABLET BY MOUTH DAILY 135 tablet 1     No current facility-administered medications for this visit. Allergies   Allergen Reactions    Latex      Social History     Tobacco Use    Smoking status: Never Smoker    Smokeless tobacco: Never Used   Substance Use Topics    Alcohol use: Yes     Alcohol/week: 0.0 standard drinks      Family History   Problem Relation Age of Onset    Heart Disease Paternal Grandmother     Stroke Paternal Grandmother        Review of Systems   Constitutional: Positive for fatigue. Negative for chills and fever. HENT: Negative for congestion, ear pain, nosebleeds, postnasal drip and sore throat. Respiratory: Negative for cough, chest tightness and wheezing. Cardiovascular: Negative for chest pain, palpitations and leg swelling. Gastrointestinal: Negative for abdominal pain and constipation. Genitourinary: Negative for dysuria and urgency. Musculoskeletal: Negative. Negative for arthralgias. Skin: Negative for rash. Neurological: Negative for dizziness and headaches. Psychiatric/Behavioral: Negative.       Vitals:    10/20/21 1157   BP: 138/88   Site: Left Upper Arm   Position: Sitting   Cuff Size: Large Adult   Pulse: 83   Resp: 18 SpO2: 98%   Weight: 215 lb (97.5 kg)   Height: 5' 6\" (1.676 m)     Body mass index is 34.7 kg/m². Physical Exam  Constitutional:       Appearance: She is well-developed. HENT:      Right Ear: External ear normal.      Left Ear: External ear normal.      Mouth/Throat:      Pharynx: No oropharyngeal exudate. Eyes:      Conjunctiva/sclera: Conjunctivae normal.      Pupils: Pupils are equal, round, and reactive to light. Neck:      Thyroid: No thyromegaly. Vascular: No JVD. Cardiovascular:      Rate and Rhythm: Normal rate. Heart sounds: Normal heart sounds. No murmur heard. Pulmonary:      Effort: No respiratory distress. Breath sounds: Normal breath sounds. No wheezing or rales. Chest:      Chest wall: No tenderness. Abdominal:      General: Bowel sounds are normal.      Palpations: Abdomen is soft. Musculoskeletal:         General: Normal range of motion. Cervical back: Neck supple. Lymphadenopathy:      Cervical: No cervical adenopathy. Skin:     General: Skin is warm. Findings: No rash. Neurological:      Mental Status: She is oriented to person, place, and time. Lab Review   No visits with results within 2 Month(s) from this visit. Latest known visit with results is:   Office Visit on 06/23/2021   Component Date Value    HPV TYPE 16 06/23/2021 Not Detected     HPV TYPE 18 06/23/2021 Not Detected     HPVOH (OTHER TYPES) 06/23/2021 Not Detected     HPV Comment 06/23/2021 See below            ASSESSMENT/PLAN:      Essential hypertension  Blood pressures now have been in good range, patient shows me all her home blood pressure readings  Rx valsartan/HCTZ 320/12.5 p.o. daily  Rx amlodipine 5 mg p.o. daily  Monitor blood pressures at home    Sleep disturbance  Wakes up at nighttime very anxious/ faster heart rate  Feels like \" inside shaking vibration\" and occasionally wakes up \"shaking\"  ? ? JUANCHO  BMI 34.7  We will proceed with sleep study    Episodes of nighttime anxiety/patient has no anxiety issues during the day overall is happy content with her life  Do not suspect that she has anxiety disorder that presents only with nighttime anxiety episodes  Once sleep study results are back and do not reveal any obstructive sleep apnea issues may need to look into other etiology                Orders Placed This Encounter   Procedures    Home Sleep Study     New Prescriptions    No medications on file         No follow-ups on file. There are no Patient Instructions on file for this visit. EMR Dragon/transcription disclaimer:Significant part of this  encounter note is electronic transcription/translationof spoken language to printed text. The electronic translation of spoken language may be erroneous, or at times, nonsensical words or phrases may be inadvertently transcribed.  Although I have reviewed the note for sucherrors, some may still exist.

## 2021-12-01 ENCOUNTER — HOSPITAL ENCOUNTER (OUTPATIENT)
Dept: SLEEP CENTER | Age: 46
Discharge: HOME | End: 2021-12-03
Payer: COMMERCIAL

## 2021-12-01 PROCEDURE — G0399 HOME SLEEP TEST/TYPE 3 PORTA: HCPCS

## 2021-12-01 PROCEDURE — 95806 SLEEP STUDY UNATT&RESP EFFT: CPT | Performed by: INTERNAL MEDICINE

## 2021-12-03 NOTE — PROGRESS NOTES
[address], [facility] [address]  Phone [phone] Fax [phone]    Patient Name [place] How Account Number [de-identified]    1975 Referring Provider Ivon Jenkins M.D.   Age/ Gender 46 years/F Interpreting physician Beola Bernheim, M.D., [place] [facility]   Neck circumference Unavailable Device Stardust II   Mallampati classification Unavailable Scoring Technician Irvin Ayers, CRT, RPSGT   [place] score  Indications for the test excessive daytime somnolence   Height 66.0 in Test Home Sleep Apnea Test   Weight 215.0 lbs Date of test 2021   BMI 34.7 Time in Bed (TIB) 448.0 minutes     Events   Index (#/hr) Total # Events Mean Duration (sec) Max Duration (sec) Supine                # Index   Central Apneas 0.0 0 0.0 0.0 0 0.0   Obstructive Apneas 73.5 549 15.8 56.0 549 73.8   Mixed Apneas 0.0 0 0.0 0.0 0 0.0   Hypopneas 24.9 186 18.9 45.0 186 25.0   Estimated AHI  #events/TIB (hrs) 98.4 735 16.5 56.0 98.8   Time in Position (minutes) 446. 3     Snoring  Snoring Rating (loudness 0-4) 1   Snoring Amount (% of total sleep time with snoring) 75.3     Oximetry distribution  <90 %  (minutes) 73.4   <85 %  (minutes) 10.4   <80 %  (minutes) 1.8   <75 %  (minutes) 0.1   <70 %  (minutes) 0.0   <60 %  (minutes) 0.0   <50 %  (minutes) 0.0   Average (%) 92   Desat Index (#/hour) 90.8   Desat Max (%) 21   Desat Max dur (sec) 98.0   Lowest SpO2 (? 2 sec) (%) 74     Heart Rate  Mean HR (BPM) 63.7   # of LHR 26   LHR min (BPM) 52   # of HHR 2   HHR max (BPM) 91                                                                 Interpretation:     1. Severe obstructive sleep apnea. 2. Obesity. 3. Hypoxia during sleep. Recommendations:    1. Consider Auto CPAP to titrate between 8cm water pressure and 20cm water pressure. 2. Weight loss and exercise. 3. Avoid risky activity such as driving if sleepy. 4. Discuss sleep hygiene with the patient. 5. Monitor PAP use and effectiveness.        Krys Meyer MD, FCCP, [facility]

## 2021-12-06 DIAGNOSIS — G47.33 OSA (OBSTRUCTIVE SLEEP APNEA): Primary | ICD-10-CM

## 2022-02-18 RX ORDER — CLONIDINE HYDROCHLORIDE 0.1 MG/1
TABLET ORAL
Qty: 18 TABLET | Refills: 0 | Status: SHIPPED | OUTPATIENT
Start: 2022-02-18 | End: 2022-03-14

## 2022-02-18 RX ORDER — AMLODIPINE BESYLATE 5 MG/1
TABLET ORAL
Qty: 135 TABLET | Refills: 1 | Status: SHIPPED | OUTPATIENT
Start: 2022-02-18 | End: 2022-03-15 | Stop reason: SDUPTHER

## 2022-02-18 NOTE — TELEPHONE ENCOUNTER
Odalys called requesting a refill of the below medication which has been pended for you:     Requested Prescriptions     Pending Prescriptions Disp Refills    amLODIPine (NORVASC) 5 MG tablet [Pharmacy Med Name: amLODIPine BESYLATE 5 MG TAB] 135 tablet 1     Sig: TAKE 1 AND 1/2 TABLET BY MOUTH DAILY    cloNIDine (CATAPRES) 0.1 MG tablet [Pharmacy Med Name: cloNIDine HCL 0.1MG TABLET] 90 tablet 1     Sig: TAKE ONE TABLET BY MOUTH EVERY NIGHT AT BEDTIME       Last Appointment Date: 10/20/2021  Next Appointment Date: 3/9/2022    Allergies   Allergen Reactions    Latex

## 2022-03-02 ENCOUNTER — OFFICE VISIT (OUTPATIENT)
Dept: PULMONOLOGY | Age: 47
End: 2022-03-02
Payer: COMMERCIAL

## 2022-03-02 VITALS
HEIGHT: 66 IN | BODY MASS INDEX: 35.13 KG/M2 | HEART RATE: 83 BPM | DIASTOLIC BLOOD PRESSURE: 96 MMHG | WEIGHT: 218.6 LBS | OXYGEN SATURATION: 100 % | SYSTOLIC BLOOD PRESSURE: 160 MMHG

## 2022-03-02 DIAGNOSIS — G47.10 HYPERSOMNIA: ICD-10-CM

## 2022-03-02 DIAGNOSIS — G47.34 SLEEP RELATED HYPOXIA: ICD-10-CM

## 2022-03-02 DIAGNOSIS — G47.33 OSA (OBSTRUCTIVE SLEEP APNEA): Primary | ICD-10-CM

## 2022-03-02 DIAGNOSIS — E66.01 MORBID OBESITY (HCC): ICD-10-CM

## 2022-03-02 PROCEDURE — 99204 OFFICE O/P NEW MOD 45 MIN: CPT | Performed by: INTERNAL MEDICINE

## 2022-03-02 ASSESSMENT — ENCOUNTER SYMPTOMS
ABDOMINAL DISTENTION: 0
SHORTNESS OF BREATH: 0
WHEEZING: 0
ANAL BLEEDING: 0
COUGH: 0
APNEA: 1
ABDOMINAL PAIN: 0
RHINORRHEA: 0
CHEST TIGHTNESS: 0
BACK PAIN: 0

## 2022-03-02 NOTE — PROGRESS NOTES
Pulmonary and Sleep Medicine    Odalys Martinez (:  1975) is a 56 y.o. female,New patient, here for evaluation of the following chief complaint(s):  New Patient (Pt came in today for sleep study follow up. She states that she has done well with the cpap until last night. She states that there is too much air coming out. )      Referring physician:  No referring provider defined for this encounter. ASSESSMENT/PLAN:  1. JUANCHO (obstructive sleep apnea)  2. Morbid obesity ([address]  3. Hypersomnia  4. Sleep related hypoxia  -     Pulse oximetry, overnight; Future     She is compliant with the CPAP and feels the CPAP helps her breathing. Will change CPAP setting to 6cm pressure. She is on nasal pillows. Discussed humidification setting. She might do better with a nasal mask. Will get overnight oximetry to assure sleep hypoxia is treated. Edel Garcia MD, [facility], [facility]    Return in about 6 weeks (around 2022). SUBJECTIVE/OBJECTIVE:  She is here for the evaluation of sleep apnea. She had a sleep study that did show severe JUANCHO with AHI of 98. She is using autoCPAP now. Her median pressure is 5.4. She has an apnea-hypopnea index of less than 2 according to the CPAP machine. It does not seem that her humidifier is set at an adequate setting. She does not feel able to find to the top and she has to add water maybe once every couple days. Prior to Visit Medications    Medication Sig Taking? Authorizing Provider   amLODIPine (NORVASC) 5 MG tablet Take one and a half tablet by mouth daily Yes Peggy Hart MD   cloNIDine (CATAPRES) 0.1 MG tablet TAKE ONE TABLET BY MOUTH EVERY NIGHT AT BEDTIME Yes Peggy Hart MD   valsartan-hydroCHLOROthiazide (DIOVAN-HCT) 320-12.5 MG per tablet TAKE ONE TABLET BY MOUTH DAILY Yes Peggy Hart MD        Review of Systems   Constitutional: Negative for activity change, appetite change, chills, diaphoresis and fatigue.    HENT: Negative for congestion, dental problem, drooling, ear discharge, postnasal drip and rhinorrhea. Eyes: Negative for visual disturbance. Respiratory: Positive for apnea. Negative for cough, chest tightness, shortness of breath and wheezing. Gastrointestinal: Negative for abdominal distention, abdominal pain and anal bleeding. Endocrine: Negative for cold intolerance, heat intolerance and polydipsia. Genitourinary: Negative for difficulty urinating, dysuria, enuresis and flank pain. Musculoskeletal: Negative for arthralgias, back pain and gait problem. Allergic/Immunologic: Negative for environmental allergies. Neurological: Negative for dizziness, facial asymmetry, light-headedness and headaches. Vitals:    03/02/22 0855   BP: (!) 160/96   Pulse: 83   SpO2: 100%     Physical Exam  Vitals reviewed. Constitutional:       Appearance: Normal appearance. HENT:      Head: Normocephalic and atraumatic. Nose: Nose normal.   Eyes:      Extraocular Movements: Extraocular movements intact. Conjunctiva/sclera: Conjunctivae normal.   Cardiovascular:      Rate and Rhythm: Normal rate and regular rhythm. Heart sounds: No murmur heard. No friction rub. Pulmonary:      Effort: Pulmonary effort is normal. No respiratory distress. Breath sounds: Normal breath sounds. No stridor. No wheezing, rhonchi or rales. Abdominal:      General: There is no distension. Palpations: There is no mass. Tenderness: There is no abdominal tenderness. There is no guarding or rebound. Musculoskeletal:      Cervical back: Normal range of motion and neck supple. Neurological:      Mental Status: She is alert and oriented to person, place, and time. This note was generated used a voice recognition software. Errors in voice recognition may have occurred. An electronic signature was used to authenticate this note.     --Primo Lowe MD

## 2022-03-07 DIAGNOSIS — E55.9 VITAMIN D DEFICIENCY: ICD-10-CM

## 2022-03-07 DIAGNOSIS — Z12.31 ENCOUNTER FOR SCREENING MAMMOGRAM FOR BREAST CANCER: ICD-10-CM

## 2022-03-07 DIAGNOSIS — E66.09 EXOGENOUS OBESITY: ICD-10-CM

## 2022-03-07 DIAGNOSIS — E78.00 PURE HYPERCHOLESTEROLEMIA: ICD-10-CM

## 2022-03-07 DIAGNOSIS — Z01.419 PAP SMEAR, AS PART OF ROUTINE GYNECOLOGICAL EXAMINATION: ICD-10-CM

## 2022-03-07 DIAGNOSIS — Z87.410 HISTORY OF CERVICAL DYSPLASIA: ICD-10-CM

## 2022-03-07 DIAGNOSIS — Z00.00 ANNUAL PHYSICAL EXAM: ICD-10-CM

## 2022-03-07 DIAGNOSIS — I10 ESSENTIAL HYPERTENSION: ICD-10-CM

## 2022-03-07 LAB
ALBUMIN SERPL-MCNC: 4.3 G/DL (ref 3.5–5.2)
ALP BLD-CCNC: 59 U/L (ref 35–104)
ALT SERPL-CCNC: 8 U/L (ref 5–33)
ANION GAP SERPL CALCULATED.3IONS-SCNC: 11 MMOL/L (ref 7–19)
AST SERPL-CCNC: 11 U/L (ref 5–32)
BASOPHILS ABSOLUTE: 0.1 K/UL (ref 0–0.2)
BASOPHILS RELATIVE PERCENT: 0.5 % (ref 0–1)
BILIRUB SERPL-MCNC: 0.3 MG/DL (ref 0.2–1.2)
BILIRUBIN URINE: NEGATIVE
BLOOD, URINE: NEGATIVE
BUN BLDV-MCNC: 13 MG/DL (ref 6–20)
CALCIUM SERPL-MCNC: 9.1 MG/DL (ref 8.6–10)
CHLORIDE BLD-SCNC: 104 MMOL/L (ref 98–111)
CHOLESTEROL, TOTAL: 178 MG/DL (ref 160–199)
CLARITY: CLEAR
CO2: 23 MMOL/L (ref 22–29)
COLOR: YELLOW
CREAT SERPL-MCNC: 0.9 MG/DL (ref 0.5–0.9)
EOSINOPHILS ABSOLUTE: 1.5 K/UL (ref 0–0.6)
EOSINOPHILS RELATIVE PERCENT: 15.8 % (ref 0–5)
GFR AFRICAN AMERICAN: >59
GFR NON-AFRICAN AMERICAN: >60
GLUCOSE BLD-MCNC: 89 MG/DL (ref 74–109)
GLUCOSE URINE: NEGATIVE MG/DL
HCT VFR BLD CALC: 38.2 % (ref 37–47)
HDLC SERPL-MCNC: 49 MG/DL (ref 65–121)
HEMOGLOBIN: 12.6 G/DL (ref 12–16)
IMMATURE GRANULOCYTES #: 0 K/UL
KETONES, URINE: NEGATIVE MG/DL
LDL CHOLESTEROL CALCULATED: 105 MG/DL
LEUKOCYTE ESTERASE, URINE: NEGATIVE
LYMPHOCYTES ABSOLUTE: 3.4 K/UL (ref 1.1–4.5)
LYMPHOCYTES RELATIVE PERCENT: 36.5 % (ref 20–40)
MCH RBC QN AUTO: 29.9 PG (ref 27–31)
MCHC RBC AUTO-ENTMCNC: 33 G/DL (ref 33–37)
MCV RBC AUTO: 90.5 FL (ref 81–99)
MONOCYTES ABSOLUTE: 0.5 K/UL (ref 0–0.9)
MONOCYTES RELATIVE PERCENT: 5.5 % (ref 0–10)
NEUTROPHILS ABSOLUTE: 3.8 K/UL (ref 1.5–7.5)
NEUTROPHILS RELATIVE PERCENT: 41.6 % (ref 50–65)
NITRITE, URINE: NEGATIVE
PDW BLD-RTO: 12.5 % (ref 11.5–14.5)
PH UA: 6 (ref 5–8)
PLATELET # BLD: 265 K/UL (ref 130–400)
PMV BLD AUTO: 9.7 FL (ref 9.4–12.3)
POTASSIUM SERPL-SCNC: 3.8 MMOL/L (ref 3.5–5)
PROTEIN UA: NEGATIVE MG/DL
RBC # BLD: 4.22 M/UL (ref 4.2–5.4)
SODIUM BLD-SCNC: 138 MMOL/L (ref 136–145)
SPECIFIC GRAVITY UA: 1.01 (ref 1–1.03)
TOTAL PROTEIN: 6.9 G/DL (ref 6.6–8.7)
TRIGL SERPL-MCNC: 120 MG/DL (ref 0–149)
TSH SERPL DL<=0.05 MIU/L-ACNC: 1.38 UIU/ML (ref 0.27–4.2)
UROBILINOGEN, URINE: 0.2 E.U./DL
VITAMIN D 25-HYDROXY: 53.8 NG/ML
WBC # BLD: 9.2 K/UL (ref 4.8–10.8)

## 2022-03-14 RX ORDER — CLONIDINE HYDROCHLORIDE 0.1 MG/1
TABLET ORAL
Qty: 30 TABLET | Refills: 1 | Status: SHIPPED | OUTPATIENT
Start: 2022-03-14 | End: 2022-03-15 | Stop reason: SDUPTHER

## 2022-03-15 ENCOUNTER — OFFICE VISIT (OUTPATIENT)
Dept: INTERNAL MEDICINE | Age: 47
End: 2022-03-15
Payer: COMMERCIAL

## 2022-03-15 VITALS
SYSTOLIC BLOOD PRESSURE: 128 MMHG | OXYGEN SATURATION: 99 % | WEIGHT: 216 LBS | HEIGHT: 66 IN | BODY MASS INDEX: 34.72 KG/M2 | HEART RATE: 66 BPM | DIASTOLIC BLOOD PRESSURE: 88 MMHG

## 2022-03-15 DIAGNOSIS — E78.00 PURE HYPERCHOLESTEROLEMIA: ICD-10-CM

## 2022-03-15 DIAGNOSIS — Z00.00 ANNUAL PHYSICAL EXAM: ICD-10-CM

## 2022-03-15 DIAGNOSIS — E55.9 VITAMIN D DEFICIENCY: ICD-10-CM

## 2022-03-15 DIAGNOSIS — G47.33 OSA ON CPAP: Primary | ICD-10-CM

## 2022-03-15 DIAGNOSIS — Z99.89 OSA ON CPAP: Primary | ICD-10-CM

## 2022-03-15 DIAGNOSIS — I10 ESSENTIAL HYPERTENSION: ICD-10-CM

## 2022-03-15 DIAGNOSIS — Z12.11 SCREENING FOR COLON CANCER: ICD-10-CM

## 2022-03-15 DIAGNOSIS — E66.09 EXOGENOUS OBESITY: ICD-10-CM

## 2022-03-15 PROCEDURE — 99396 PREV VISIT EST AGE 40-64: CPT | Performed by: INTERNAL MEDICINE

## 2022-03-15 RX ORDER — VALSARTAN AND HYDROCHLOROTHIAZIDE 320; 12.5 MG/1; MG/1
TABLET, FILM COATED ORAL
Qty: 90 TABLET | Refills: 1 | Status: SHIPPED | OUTPATIENT
Start: 2022-03-15 | End: 2022-09-22

## 2022-03-15 RX ORDER — AMLODIPINE BESYLATE 5 MG/1
TABLET ORAL
Qty: 135 TABLET | Refills: 1 | Status: SHIPPED | OUTPATIENT
Start: 2022-03-15

## 2022-03-15 RX ORDER — CLONIDINE HYDROCHLORIDE 0.1 MG/1
TABLET ORAL
Qty: 30 TABLET | Refills: 1 | Status: SHIPPED | OUTPATIENT
Start: 2022-03-15 | End: 2022-08-08

## 2022-03-15 ASSESSMENT — ENCOUNTER SYMPTOMS
ABDOMINAL PAIN: 0
SORE THROAT: 0
CHEST TIGHTNESS: 0
CONSTIPATION: 0
WHEEZING: 0
COUGH: 0

## 2022-03-15 ASSESSMENT — PATIENT HEALTH QUESTIONNAIRE - PHQ9
1. LITTLE INTEREST OR PLEASURE IN DOING THINGS: 0
SUM OF ALL RESPONSES TO PHQ QUESTIONS 1-9: 0
SUM OF ALL RESPONSES TO PHQ QUESTIONS 1-9: 0
2. FEELING DOWN, DEPRESSED OR HOPELESS: 0
SUM OF ALL RESPONSES TO PHQ QUESTIONS 1-9: 0
SUM OF ALL RESPONSES TO PHQ QUESTIONS 1-9: 0
SUM OF ALL RESPONSES TO PHQ9 QUESTIONS 1 & 2: 0

## 2022-03-15 NOTE — PROGRESS NOTES
Chief Complaint:   Amy Mccauley is a 56 y.o. female who presents forcomplete physical exam.    History of Present Illness:      Amy Mccauley is a 56 y.o. female who presents todayfor wellness visit AND follow up on her chronic medical conditions as noted below. Patient Active Problem List    Diagnosis Date Noted    JUANCHO (obstructive sleep apnea) 03/02/2022    Morbid obesity ([address] 03/02/2022    Hypersomnia 03/02/2022    Essential hypertension 11/24/2017    Pure hypercholesterolemia 11/24/2017       No past medical history on file. No past surgical history on file. Current Outpatient Medications   Medication Sig Dispense Refill    valsartan-hydroCHLOROthiazide (DIOVAN-HCT) 320-12.5 MG per tablet TAKE ONE TABLET BY MOUTH DAILY 90 tablet 1    cloNIDine (CATAPRES) 0.1 MG tablet TAKE ONE TABLET BY MOUTH EVERY NIGHT AT BEDTIME 30 tablet 1    amLODIPine (NORVASC) 5 MG tablet Take one and a half tablet by mouth daily 135 tablet 1     No current facility-administered medications for this visit. 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   Tobacco Use    Smoking status: Never Smoker    Smokeless tobacco: Never Used   Substance and Sexual Activity    Alcohol use: Yes     Alcohol/week: 0.0 standard drinks    Drug use: No    Sexual activity: None   Other Topics Concern    None   Social History Narrative    Son born 2009    Daughter 2016     Social Determinants of Health     Financial Resource Strain:     Difficulty of Paying Living Expenses: Not on file   Food Insecurity:     Worried About Running Out of Food in the Last Year: Not on file    [place] of Food in the Last Year: Not on file   Transportation Needs:     Lack of Transportation (Medical):  Not on file    Lack of Transportation (Non-Medical): Not on file   Physical Activity:     Days of Exercise per Week: Not on file    Minutes of Exercise per Session: Not on file   Stress:     Feeling of Stress : Not on file   Social Connections:     Frequency of Communication with Friends and Family: Not on file    Frequency of Social Gatherings with Friends and Family: Not on file    Attends Adventism Services: Not on file    Active Member of [address] [place] [address] [employer] or Organizations: Not on file    Attends Club or Organization Meetings: Not on file    Marital Status: Not on file   Intimate Partner Violence:     Fear of Current or Ex-Partner: Not on file    Emotionally Abused: Not on file    Physically Abused: Not on file    Sexually Abused: Not on file   Housing Stability:     Unable to Pay for Housing in the Last Year: Not on file    Number of [place] in the Last Year: Not on file    Unstable Housing in the Last Year: Not on file     Family History   Problem Relation Age of Onset    Heart Disease Paternal Grandmother     Stroke Paternal Grandmother         No past surgical history on file.       Lab Review   Orders Only on 03/07/2022   Component Date Value    WBC 03/07/2022 9.2     RBC 03/07/2022 4.22     Hemoglobin 03/07/2022 12.6     Hematocrit 03/07/2022 38.2     MCV 03/07/2022 90.5     MCH 03/07/2022 29.9     MCHC 03/07/2022 33.0     RDW 03/07/2022 12.5     Platelets 39/80/8202 265     MPV 03/07/2022 9.7     Neutrophils % 03/07/2022 41.6*    Lymphocytes % 03/07/2022 36.5     Monocytes % 03/07/2022 5.5     Eosinophils % 03/07/2022 15.8*    Basophils % 03/07/2022 0.5     Neutrophils Absolute 03/07/2022 3.8     Immature Granulocytes # 03/07/2022 0.0     Lymphocytes Absolute 03/07/2022 3.4     Monocytes Absolute 03/07/2022 0.50     Eosinophils Absolute 03/07/2022 1.50*    Basophils Absolute 03/07/2022 0.10     Sodium 03/07/2022 138     Potassium 03/07/2022 3.8     Chloride 03/07/2022 104     CO2 03/07/2022 23     Anion Gap 03/07/2022 11     Glucose 03/07/2022 89     BUN 03/07/2022 13     CREATININE 03/07/2022 0.9     GFR Non- 03/07/2022 >60     GFR  03/07/2022 >59     Calcium 03/07/2022 9.1     Total Protein 03/07/2022 6.9     Albumin 03/07/2022 4.3     Total Bilirubin 03/07/2022 0.3     Alkaline Phosphatase 03/07/2022 59     ALT 03/07/2022 8     AST 03/07/2022 11     Cholesterol, Total 03/07/2022 178     Triglycerides 03/07/2022 120     HDL 03/07/2022 49*    LDL Calculated 03/07/2022 105     Color, UA 03/07/2022 YELLOW     Clarity, UA 03/07/2022 Clear     Glucose, Ur 03/07/2022 Negative     Bilirubin Urine 03/07/2022 Negative     Ketones, Urine 03/07/2022 Negative     Specific Gravity, UA 03/07/2022 1.007     Blood, Urine 03/07/2022 Negative     pH, UA 03/07/2022 6.0     Protein, UA 03/07/2022 Negative     Urobilinogen, Urine 03/07/2022 0.2     Nitrite, Urine 03/07/2022 Negative     Leukocyte Esterase, Urine 03/07/2022 Negative     TSH 03/07/2022 1.380     Vit D, 25-Hydroxy 03/07/2022 53.8          Review of Systems   Constitutional: Positive for fatigue. Negative for chills and fever. HENT: Negative for congestion, ear pain, nosebleeds, postnasal drip and sore throat. Respiratory: Negative for cough, chest tightness and wheezing. Cardiovascular: Negative for chest pain, palpitations and leg swelling. Gastrointestinal: Negative for abdominal pain and constipation. Genitourinary: Negative for dysuria and urgency. Musculoskeletal: Negative. Negative for arthralgias. Skin: Negative for rash. Neurological: Negative for dizziness and headaches. Psychiatric/Behavioral: Negative.            Vitals:    03/15/22 0821   BP: 128/88   Pulse: 66   SpO2: 99%   Weight: 216 lb (98 kg)   Height: 5' 6\" (1.676 m)      Wt Readings from Last 3 Encounters:   03/15/22 216 lb (98 kg)   03/02/22 218 lb 9.6 oz (99.2 kg)   10/20/21 215 lb (97.5 kg) Body mass index is 34.86 kg/m². BP Readings from Last 3 Encounters:   03/15/22 128/88   03/02/22 (!) 160/96   10/20/21 138/88       Physical Exam  Constitutional:       Appearance: She is well-developed. She is obese. HENT:      Right Ear: External ear normal.      Left Ear: External ear normal.      Mouth/Throat:      Pharynx: No oropharyngeal exudate. Eyes:      Conjunctiva/sclera: Conjunctivae normal.      Pupils: Pupils are equal, round, and reactive to light. Neck:      Thyroid: No thyromegaly. Vascular: No JVD. Cardiovascular:      Rate and Rhythm: Normal rate. Heart sounds: Normal heart sounds. No murmur heard. Pulmonary:      Effort: No respiratory distress. Breath sounds: Normal breath sounds. No wheezing or rales. Chest:      Chest wall: No tenderness. Abdominal:      General: Bowel sounds are normal.      Palpations: Abdomen is soft. Musculoskeletal:      Cervical back: Neck supple. Lymphadenopathy:      Cervical: No cervical adenopathy. Skin:     Findings: No rash.            ASSESSMENT/PLAN    Annual physical  * pap per gyn 6/2021/history of mild cervical dysplasia  * mammogram- schedule  * cscope - schedule      Essential hypertension  Blood pressures now have been in good range, patient shows me all her home blood pressure readings  Rx valsartan/HCTZ 320/12.5 p.o. daily  Rx amlodipine 5 mg p.o. daily  Monitor blood pressures at home    Pure hypercholesterolemia-   her   Healthy, mostly fiber rich nonstarchy plant-based diet recommended  Recommend to decrease intake of processed foods, simple carbohydrates and animal-based products that high in saturated fats    Obesity  Pt was given counseling about diet and exercise including education on what calories are, where calories come from, the need for portion control,following lower carbohydrate dietary regimen and healthy snacks along side an active lifestyle with supplementary exercise of approx 30 minutes a

## 2022-03-25 RX ORDER — VALSARTAN AND HYDROCHLOROTHIAZIDE 320; 12.5 MG/1; MG/1
TABLET, FILM COATED ORAL
Qty: 90 TABLET | Refills: 1 | OUTPATIENT
Start: 2022-03-25

## 2022-03-25 NOTE — TELEPHONE ENCOUNTER
Odalys called requesting a refill of the below medication which has been pended for you:     Requested Prescriptions     Pending Prescriptions Disp Refills    valsartan-hydroCHLOROthiazide (DIOVAN-HCT) 320-12.5 MG per tablet [Pharmacy Med Name: Cari Mauricio 320-12.5 MG TAB] 90 tablet 1     Sig: TAKE ONE TABLET BY MOUTH DAILY       Last Appointment Date: 3/15/2022  Next Appointment Date: Visit date not found    Allergies   Allergen Reactions    Latex

## 2022-03-28 DIAGNOSIS — Z11.52 ENCOUNTER FOR SCREENING FOR COVID-19: Primary | ICD-10-CM

## 2022-04-12 ENCOUNTER — HOSPITAL ENCOUNTER (OUTPATIENT)
Dept: WOMENS IMAGING | Age: 47
Discharge: HOME | End: 2022-04-12
Payer: COMMERCIAL

## 2022-04-12 DIAGNOSIS — Z12.31 ENCOUNTER FOR SCREENING MAMMOGRAM FOR BREAST CANCER: ICD-10-CM

## 2022-04-12 PROCEDURE — 77063 BREAST TOMOSYNTHESIS BI: CPT

## 2022-05-19 ENCOUNTER — OFFICE VISIT (OUTPATIENT)
Dept: PULMONOLOGY | Age: 47
End: 2022-05-19
Payer: COMMERCIAL

## 2022-05-19 VITALS
WEIGHT: 214.6 LBS | HEIGHT: 66 IN | DIASTOLIC BLOOD PRESSURE: 88 MMHG | HEART RATE: 67 BPM | SYSTOLIC BLOOD PRESSURE: 142 MMHG | BODY MASS INDEX: 34.49 KG/M2 | OXYGEN SATURATION: 100 %

## 2022-05-19 DIAGNOSIS — E66.01 MORBID OBESITY (HCC): ICD-10-CM

## 2022-05-19 DIAGNOSIS — G47.10 HYPERSOMNIA: ICD-10-CM

## 2022-05-19 DIAGNOSIS — G47.33 OSA (OBSTRUCTIVE SLEEP APNEA): Primary | ICD-10-CM

## 2022-05-19 DIAGNOSIS — G47.34 SLEEP RELATED HYPOXIA: ICD-10-CM

## 2022-05-19 PROCEDURE — 99214 OFFICE O/P EST MOD 30 MIN: CPT | Performed by: INTERNAL MEDICINE

## 2022-05-19 ASSESSMENT — ENCOUNTER SYMPTOMS
ANAL BLEEDING: 0
ABDOMINAL DISTENTION: 0
SHORTNESS OF BREATH: 0
WHEEZING: 0
RHINORRHEA: 0
CHEST TIGHTNESS: 0
BACK PAIN: 0
COUGH: 0
APNEA: 1
ABDOMINAL PAIN: 0

## 2022-05-19 NOTE — PROGRESS NOTES
Pulmonary and Sleep Medicine    Odalys Martinez (:  1975) is a 56 y.o. female,Established patient, here for evaluation of the following chief complaint(s):  Follow-up (Pt came in today for a cpap follow up.)      Referring physician:  No referring provider defined for this encounter. ASSESSMENT/PLAN:  1. JUANCHO (obstructive sleep apnea)  2. Morbid obesity ([address]  3. Hypersomnia  4. Sleep related hypoxia        We will continue with the current management with the CPAP, she is compliant with the CPAP, she feels it helps. Andrews Charles MD, [facility], [facility]    Return in about 6 months (around 2022). SUBJECTIVE/OBJECTIVE:  She is here for follow up on JUANCHO. She is using the CPAP, she is compliant with the CPAP. She feels the CPAP helps her symptoms. Prior to Visit Medications    Medication Sig Taking? Authorizing Provider   valsartan-hydroCHLOROthiazide (DIOVAN-HCT) 320-12.5 MG per tablet TAKE ONE TABLET BY MOUTH DAILY Yes Stephania Delgado MD   cloNIDine (CATAPRES) 0.1 MG tablet TAKE ONE TABLET BY MOUTH EVERY NIGHT AT BEDTIME Yes Stephania Delgado MD   amLODIPine (NORVASC) 5 MG tablet Take one and a half tablet by mouth daily Yes Stephania Delgado MD        Review of Systems   Constitutional: Negative for activity change, appetite change, chills, diaphoresis and fatigue. HENT: Negative for congestion, dental problem, drooling, ear discharge, postnasal drip and rhinorrhea. Eyes: Negative for visual disturbance. Respiratory: Positive for apnea. Negative for cough, chest tightness, shortness of breath and wheezing. Gastrointestinal: Negative for abdominal distention, abdominal pain and anal bleeding. Endocrine: Negative for cold intolerance, heat intolerance and polydipsia. Genitourinary: Negative for difficulty urinating, dysuria, enuresis and flank pain. Musculoskeletal: Negative for arthralgias, back pain and gait problem. Allergic/Immunologic: Negative for environmental allergies. Neurological: Negative for dizziness, facial asymmetry, light-headedness and headaches. Vitals:    05/19/22 1226   BP: (!) 142/88   Pulse: 67   SpO2: 100%     . FLOWAMB[11   .CUCETSQ[41    BMI Readings from Last 1 Encounters:   05/19/22 34.64 kg/m²       Physical Exam  Vitals reviewed. Constitutional:       Appearance: Normal appearance. HENT:      Head: Normocephalic and atraumatic. Nose: Nose normal.   Eyes:      Extraocular Movements: Extraocular movements intact. Conjunctiva/sclera: Conjunctivae normal.   Cardiovascular:      Rate and Rhythm: Normal rate and regular rhythm. Heart sounds: No murmur heard. No friction rub. Pulmonary:      Effort: Pulmonary effort is normal. No respiratory distress. Breath sounds: Normal breath sounds. No stridor. No wheezing, rhonchi or rales. Abdominal:      General: There is no distension. Palpations: There is no mass. Tenderness: There is no abdominal tenderness. There is no guarding or rebound. Musculoskeletal:      Cervical back: Normal range of motion and neck supple. Neurological:      Mental Status: She is alert and oriented to person, place, and time. This note was generated used a voice recognition software. Errors in voice recognition may have occurred. An electronic signature was used to authenticate this note.     --Ellen Longoria MD

## 2022-06-07 ENCOUNTER — APPOINTMENT (OUTPATIENT)
Dept: OPERATING ROOM | Age: 47
End: 2022-06-07

## 2022-06-07 ENCOUNTER — ANESTHESIA (OUTPATIENT)
Dept: OPERATING ROOM | Age: 47
End: 2022-06-07

## 2022-06-07 ENCOUNTER — HOSPITAL ENCOUNTER (OUTPATIENT)
Age: 47
Setting detail: OUTPATIENT SURGERY
Discharge: HOME | End: 2022-06-07
Attending: INTERNAL MEDICINE | Admitting: INTERNAL MEDICINE
Payer: COMMERCIAL

## 2022-06-07 ENCOUNTER — ANESTHESIA EVENT (OUTPATIENT)
Dept: OPERATING ROOM | Age: 47
End: 2022-06-07

## 2022-06-07 VITALS
OXYGEN SATURATION: 97 % | RESPIRATION RATE: 14 BRPM | SYSTOLIC BLOOD PRESSURE: 138 MMHG | WEIGHT: 211 LBS | BODY MASS INDEX: 33.91 KG/M2 | HEIGHT: 66 IN | HEART RATE: 63 BPM | TEMPERATURE: 98.8 F | DIASTOLIC BLOOD PRESSURE: 100 MMHG

## 2022-06-07 PROCEDURE — G0121 COLON CA SCRN NOT HI RSK IND: HCPCS

## 2022-06-07 PROCEDURE — 45378 DIAGNOSTIC COLONOSCOPY: CPT | Performed by: INTERNAL MEDICINE

## 2022-06-07 RX ORDER — SODIUM CHLORIDE 9 MG/ML
INJECTION, SOLUTION INTRAVENOUS CONTINUOUS
Status: DISCONTINUED | OUTPATIENT
Start: 2022-06-07 | End: 2022-06-07 | Stop reason: HOSPADM

## 2022-06-07 RX ORDER — LIDOCAINE HYDROCHLORIDE 10 MG/ML
INJECTION, SOLUTION EPIDURAL; INFILTRATION; INTRACAUDAL; PERINEURAL PRN
Status: DISCONTINUED | OUTPATIENT
Start: 2022-06-07 | End: 2022-06-07 | Stop reason: SDUPTHER

## 2022-06-07 RX ORDER — SODIUM CHLORIDE 9 MG/ML
INJECTION, SOLUTION INTRAVENOUS PRN
Status: CANCELLED | OUTPATIENT
Start: 2022-06-07

## 2022-06-07 RX ORDER — ONDANSETRON 2 MG/ML
4 INJECTION INTRAMUSCULAR; INTRAVENOUS
Status: CANCELLED | OUTPATIENT
Start: 2022-06-07 | End: 2022-06-07

## 2022-06-07 RX ORDER — PROPOFOL 10 MG/ML
INJECTION, EMULSION INTRAVENOUS PRN
Status: DISCONTINUED | OUTPATIENT
Start: 2022-06-07 | End: 2022-06-07 | Stop reason: SDUPTHER

## 2022-06-07 RX ORDER — SODIUM CHLORIDE 0.9 % (FLUSH) 0.9 %
5-40 SYRINGE (ML) INJECTION PRN
Status: CANCELLED | OUTPATIENT
Start: 2022-06-07

## 2022-06-07 RX ORDER — DIPHENHYDRAMINE HYDROCHLORIDE 50 MG/ML
12.5 INJECTION INTRAMUSCULAR; INTRAVENOUS
Status: CANCELLED | OUTPATIENT
Start: 2022-06-07 | End: 2022-06-07

## 2022-06-07 RX ORDER — SODIUM CHLORIDE 0.9 % (FLUSH) 0.9 %
5-40 SYRINGE (ML) INJECTION EVERY 12 HOURS SCHEDULED
Status: CANCELLED | OUTPATIENT
Start: 2022-06-07

## 2022-06-07 RX ADMIN — LIDOCAINE HYDROCHLORIDE 30 MG: 10 INJECTION, SOLUTION EPIDURAL; INFILTRATION; INTRACAUDAL; PERINEURAL at 09:48

## 2022-06-07 RX ADMIN — PROPOFOL 240 MG: 10 INJECTION, EMULSION INTRAVENOUS at 09:48

## 2022-06-07 RX ADMIN — SODIUM CHLORIDE: 9 INJECTION, SOLUTION INTRAVENOUS at 08:28

## 2022-06-07 ASSESSMENT — PAIN SCALES - GENERAL
PAINLEVEL_OUTOF10: 0
PAINLEVEL_OUTOF10: 0

## 2022-06-07 NOTE — OP NOTE
Patient: Anca Thomson : 1975  Med Rec#: [id number] Acc#: [id number]   Primary Care Provider Erik Benavidez MD    Date of Procedure:  2022    Endoscopist: Graciela Randhawa MD, MD    Referring Provider: Erik Benavidez MD,     Operation Performed: Colonoscopy up to the terminal ileum    Indications: Colon cancer screening    Anesthesia:  Sedation was administered by anesthesia who monitored the patient during the procedure. I met with Odalys Martinez prior to procedure. We discussed the procedure itself, and I have discussed the risks of endoscopy (including-- but not limited to-- pain, discomfort, bleeding potentially requiring second endoscopic procedure and/or blood transfusion, organ perforation requiring operative repair, damage to organs near the colon, infection, aspiration, cardiopulmonary/allergic reaction), benefits, indications to endoscopy. Additionally, we discussed options other than colonoscopy. The patient expressed understanding. All questions answered. The patient decided to proceed with the procedure. Signed informed consent was placed on the chart. Blood Loss: minimal    Withdrawal time: More than 6 minutes  Bowel Prep: Fair  with small amounts of thick semisolid stool and moderate amount of thick, opaque liquid scattered in patchy segments throughout the colon obscuring the underlying mucosa. Lesions including polyps may have been missed. Complications: no immediate complications    DESCRIPTION OF PROCEDURE:     A time out was performed. After written informed consent was obtained, the patient was placed in the left lateral position. The perianal area was inspected, and a digital rectal exam was performed. A rectal exam was performed: normal tone, no palpable lesions. At this point, a forward viewing Olympus colonoscope was inserted into the anus and carefully advanced to the terminal ileum. The cecum was identified by the ileocecal valve and the appendiceal orifice. The colonoscope was then slowly withdrawn with careful inspection of the mucosa in a linear and circumferential fashion. The scope was retroflexed in the rectum. Suction was utilized during the procedure to remove as much air as possible from the bowel. The colonoscope was removed from the patient, and the procedure was terminated. Findings are listed below. Findings:     NO large polyps or masses or strictures or colitis. Suboptimal exam due to prep quality as described above. Mild diverticulosis in the left colon  Internal hemorrhoids-Grade 1  Where it was clearly visible, the mucosa appeared normal throughout the entire examined colon  Retroflexion in the rectum was otherwise normal and revealed no further abnormalities     Recommendations:  1. Repeat colonoscopy: In 5 years due to her prep quality today; sooner if her personal or family history as pertaining to colorectal cancer risk changes requiring an earlier exam or if the patient were to develop lower GI symptoms such as bleeding, abdominal pain, change in bowel habits or stool caliber or if the patient has anemia or unexplained weight loss in the future. 2. - Resume previous meds and diet  - GI clinic f/u PRN   - Keep scheduled f/u appts with other MDs     Findings and recommendations were discussed w/ the patient. A copy of the images was provided.     Brianda Berman MD, MD  6/7/2022  9:52 AM

## 2022-06-07 NOTE — ANESTHESIA POSTPROCEDURE EVALUATION
Department of Anesthesiology  Postprocedure Note    Patient: Zain Wheatley  MRN: [id number]  YOB: 1975  Date of evaluation: 6/7/2022  Time:  10:06 AM     Procedure Summary     Date: 06/07/22 Room / Location: [facility] ENDO 02 / [address] [place] [address]    Anesthesia Start: 1052 Anesthesia Stop: 1006    Procedure: COLORECTAL CANCER SCREENING, NOT HIGH RISK (N/A Abdomen) Diagnosis:       Colon cancer screening      (SCREEN)    Surgeons: Don Weems MD Responsible Provider: HECTOR Oliver CRNA    Anesthesia Type: general, TIVA ASA Status: 2          Anesthesia Type: No value filed. Adilene Phase I:      Adilene Phase II:      Last vitals: Reviewed and per EMR flowsheets.        Anesthesia Post Evaluation    Patient location during evaluation: bedside  Patient participation: complete - patient participated  Level of consciousness: awake  Pain score: 0  Airway patency: patent  Nausea & Vomiting: no nausea and no vomiting  Complications: no  Cardiovascular status: hemodynamically stable  Respiratory status: acceptable, room air and spontaneous ventilation  Hydration status: euvolemic

## 2022-06-07 NOTE — ANESTHESIA PRE PROCEDURE
Department of Anesthesiology  Preprocedure Note       Name:  Lam Maurer   Age:  56 y.o.  :  1975                                          MRN:  [id number]         Date:  2022      Surgeon: Asmita Dobbins):  Karen Almonte MD    Procedure: Procedure(s):  COLORECTAL CANCER SCREENING, NOT HIGH RISK    Medications prior to admission:   Prior to Admission medications    Medication Sig Start Date End Date Taking? Authorizing Provider   valsartan-hydroCHLOROthiazide (DIOVAN-HCT) 320-12.5 MG per tablet TAKE ONE TABLET BY MOUTH DAILY 3/15/22   Genevieve Kennedy MD   cloNIDine (CATAPRES) 0.1 MG tablet TAKE ONE TABLET BY MOUTH EVERY NIGHT AT BEDTIME 3/15/22   Genevieve Kennedy MD   amLODIPine (NORVASC) 5 MG tablet Take one and a half tablet by mouth daily 3/15/22   Genevieve Kennedy MD       Current medications:    Current Facility-Administered Medications   Medication Dose Route Frequency Provider Last Rate Last Admin    0.9 % sodium chloride infusion   IntraVENous Continuous Karen Almonte  mL/hr at 22 0828 New Bag at 22 5790       Allergies: Allergies   Allergen Reactions    Latex        Problem List:    Patient Active Problem List   Diagnosis Code    Essential hypertension I10    Pure hypercholesterolemia E78.00    JUANCHO (obstructive sleep apnea) G47.33    Morbid obesity (HCC) E66.01    Hypersomnia G47.10       Past Medical History:        Diagnosis Date    Hypertension     Sleep apnea     sleeps with cpap       Past Surgical History:        Procedure Laterality Date    LEEP         Social History:    Social History     Tobacco Use    Smoking status: Never Smoker    Smokeless tobacco: Never Used   Substance Use Topics    Alcohol use:  Yes     Alcohol/week: 0.0 standard drinks                                Counseling given: Not Answered      Vital Signs (Current):   Vitals:    22 0821   BP: 132/86   Pulse: 67   Resp: 16   Temp: 98.8 °F (37.1 °C)   SpO2: 98%   Weight: 211 lb (95.7 kg)   Height: 5' 6\" (1.676 m)                                              BP Readings from Last 3 Encounters:   06/07/22 132/86   05/19/22 (!) 142/88   03/15/22 128/88       NPO Status: Time of last liquid consumption: 0330                        Time of last solid consumption: 2300                        Date of last liquid consumption: 06/07/22                        Date of last solid food consumption: 06/05/22    BMI:   Wt Readings from Last 3 Encounters:   06/07/22 211 lb (95.7 kg)   05/19/22 214 lb 9.6 oz (97.3 kg)   03/15/22 216 lb (98 kg)     Body mass index is 34.06 kg/m². CBC:   Lab Results   Component Value Date    WBC 9.2 03/07/2022    RBC 4.22 03/07/2022    HGB 12.6 03/07/2022    HCT 38.2 03/07/2022    MCV 90.5 03/07/2022    RDW 12.5 03/07/2022     03/07/2022       CMP:   Lab Results   Component Value Date     03/07/2022    K 3.8 03/07/2022     03/07/2022    CO2 23 03/07/2022    BUN 13 03/07/2022    CREATININE 0.9 03/07/2022    GFRAA >59 03/07/2022    LABGLOM >60 03/07/2022    GLUCOSE 89 03/07/2022    PROT 6.9 03/07/2022    CALCIUM 9.1 03/07/2022    BILITOT 0.3 03/07/2022    ALKPHOS 59 03/07/2022    AST 11 03/07/2022    ALT 8 03/07/2022       POC Tests: No results for input(s): POCGLU, POCNA, POCK, POCCL, POCBUN, POCHEMO, POCHCT in the last 72 hours.     Coags: No results found for: PROTIME, INR, APTT    HCG (If Applicable): No results found for: PREGTESTUR, PREGSERUM, HCG, HCGQUANT     ABGs: No results found for: PHART, PO2ART, OUB1UXF, CGJ7FDV, BEART, A0VZTTPU     Type & Screen (If Applicable):  No results found for: LABABO, LABRH    Drug/Infectious Status (If Applicable):  No results found for: HIV, HEPCAB    COVID-19 Screening (If Applicable): No results found for: COVID19        Anesthesia Evaluation    Airway: Mallampati: I  TM distance: >3 FB   Neck ROM: full  Mouth opening: > = 3 FB   Dental: normal exam         Pulmonary:normal exam  breath sounds clear to auscultation  (+) sleep apnea:                             Cardiovascular:  Exercise tolerance: good (>4 METS),   (+) hyperlipidemia        Rhythm: regular  Rate: normal           Beta Blocker:  Not on Beta Blocker         Neuro/Psych:   Negative Neuro/Psych ROS              GI/Hepatic/Renal:   (+) morbid obesity          Endo/Other: Negative Endo/Other ROS                    Abdominal:             Vascular: negative vascular ROS. Other Findings:           Anesthesia Plan      general and TIVA     ASA 2       Induction: intravenous. Anesthetic plan and risks discussed with patient.                         HECTOR Nolasco - CRNA   6/7/2022

## 2022-06-07 NOTE — H&P
Patient Name: Janny Elizabeth  : 1975  MRN: [id number]  DATE: 22    Allergies: Allergies   Allergen Reactions    Latex         ENDOSCOPY  History and Physical    Procedure:    [] Diagnostic Colonoscopy       [x] Screening Colonoscopy  [] EGD      [] ERCP      [] EUS       [] Other    [x] Previous office notes/History and Physical reviewed from the patients chart. Please see EMR for further details of HPI. I have examined the patient's status immediately prior to the procedure and:      Indications/HPI:    []Abdominal Pain   []Cancer- GI/Lung     []Fhx of colon CA/polyps  []History of Polyps  []Barretts            []Melena  []Abnormal Imaging              []Dysphagia              []Persistent Pneumonia   []Anemia                            []Food Impaction        []History of Polyps  [] GI Bleed             []Pulmonary nodule/Mass   []Change in bowel habits []Heartburn/Reflux  []Rectal Bleed (BRBPR)  []Chest Pain - Non Cardiac []Heme (+) Stool []Ulcers  []Constipation  []Hemoptysis  []Varices  []Diarrhea  []Hypoxemia    []Nausea/Vomiting   [x]Screening   []Crohns/Colitis  []Other:     Anesthesia:   [x] MAC [] Moderate Sedation   [] General   [] None     ROS: 12 pt Review of Symptoms was negative unless mentioned above    Medications:   Prior to Admission medications    Medication Sig Start Date End Date Taking?  Authorizing Provider   valsartan-hydroCHLOROthiazide (DIOVAN-HCT) 320-12.5 MG per tablet TAKE ONE TABLET BY MOUTH DAILY 3/15/22   Martha Gibbs MD   cloNIDine (CATAPRES) 0.1 MG tablet TAKE ONE TABLET BY MOUTH EVERY NIGHT AT BEDTIME 3/15/22   Martha Gibbs MD   amLODIPine (NORVASC) 5 MG tablet Take one and a half tablet by mouth daily 3/15/22   Martha Gibbs MD       Past Medical History:  Past Medical History:   Diagnosis Date    Hypertension     Sleep apnea     sleeps with cpap       Past Surgical History:  Past Surgical History:   Procedure Laterality Date    LEEP         Social History:  Social History     Tobacco Use    Smoking status: Never Smoker    Smokeless tobacco: Never Used   Substance Use Topics    Alcohol use: Yes     Alcohol/week: 0.0 standard drinks    Drug use: No       Vital Signs:   Vitals:    06/07/22 0821   BP: 132/86   Pulse: 67   Resp: 16   Temp: 98.8 °F (37.1 °C)   SpO2: 98%        Physical Exam:  Cardiac:  [x]WNL  []Comments:  Pulmonary:  [x]WNL   []Comments:  Neuro/Mental Status:  [x]WNL  []Comments:  Abdominal:  [x]WNL    []Comments:  Other:   []WNL  []Comments:    Informed Consent:  The risks and benefits of the procedure have been discussed with either the patient or if they cannot consent, their representative. Assessment:  Patient examined and appropriate for planned sedation and procedure. Plan:  Proceed with planned sedation and procedure as above.          Sharon Hodge MD

## 2022-08-08 RX ORDER — CLONIDINE HYDROCHLORIDE 0.1 MG/1
TABLET ORAL
Qty: 30 TABLET | Refills: 5 | Status: SHIPPED | OUTPATIENT
Start: 2022-08-08

## 2022-08-08 NOTE — TELEPHONE ENCOUNTER
Odalys called requesting a refill of the below medication which has been pended for you:     Requested Prescriptions     Pending Prescriptions Disp Refills    cloNIDine (CATAPRES) 0.1 MG tablet [Pharmacy Med Name: cloNIDine HCL 0.1MG TABLET] 30 tablet 5     Sig: TAKE ONE TABLET BY MOUTH EVERY NIGHT AT BEDTIME       Last Appointment Date: 3/15/2022  Next Appointment Date: Visit date not found    Allergies   Allergen Reactions    Latex

## 2022-09-22 RX ORDER — VALSARTAN AND HYDROCHLOROTHIAZIDE 320; 12.5 MG/1; MG/1
TABLET, FILM COATED ORAL
Qty: 90 TABLET | Refills: 1 | Status: SHIPPED | OUTPATIENT
Start: 2022-09-22

## 2022-09-22 NOTE — TELEPHONE ENCOUNTER
Odalys called requesting a refill of the below medication which has been pended for you:     Requested Prescriptions     Pending Prescriptions Disp Refills    valsartan-hydroCHLOROthiazide (DIOVAN-HCT) 320-12.5 MG per tablet [Pharmacy Med Name: Sukhwinder Mejia 320-12.5 MG TAB] 90 tablet 1     Sig: TAKE ONE TABLET BY MOUTH DAILY       Last Appointment Date: 3/15/2022  Next Appointment Date: 3/16/2023    Allergies   Allergen Reactions    Latex

## 2022-10-31 ENCOUNTER — OFFICE VISIT (OUTPATIENT)
Dept: OBGYN CLINIC | Age: 47
End: 2022-10-31
Payer: COMMERCIAL

## 2022-10-31 VITALS
HEIGHT: 65 IN | WEIGHT: 218 LBS | BODY MASS INDEX: 36.32 KG/M2 | DIASTOLIC BLOOD PRESSURE: 97 MMHG | SYSTOLIC BLOOD PRESSURE: 157 MMHG | HEART RATE: 75 BPM

## 2022-10-31 DIAGNOSIS — N92.6 IRREGULAR PERIODS: ICD-10-CM

## 2022-10-31 DIAGNOSIS — Z01.419 WELL WOMAN EXAM: Primary | ICD-10-CM

## 2022-10-31 DIAGNOSIS — Z11.51 SCREENING FOR HPV (HUMAN PAPILLOMAVIRUS): ICD-10-CM

## 2022-10-31 DIAGNOSIS — Z12.4 SCREENING FOR CERVICAL CANCER: ICD-10-CM

## 2022-10-31 PROCEDURE — 99396 PREV VISIT EST AGE 40-64: CPT | Performed by: NURSE PRACTITIONER

## 2022-10-31 PROCEDURE — 3074F SYST BP LT 130 MM HG: CPT | Performed by: NURSE PRACTITIONER

## 2022-10-31 PROCEDURE — 3078F DIAST BP <80 MM HG: CPT | Performed by: NURSE PRACTITIONER

## 2022-10-31 ASSESSMENT — ENCOUNTER SYMPTOMS
ALLERGIC/IMMUNOLOGIC NEGATIVE: 1
GASTROINTESTINAL NEGATIVE: 1
RESPIRATORY NEGATIVE: 1
EYES NEGATIVE: 1

## 2022-10-31 NOTE — PROGRESS NOTES
Frederick Davalos is a 56 y.o. female who presents today for her medical conditions/ complaints as noted below. Odalys VERA Juan is c/o of Annual Exam        HPI  Pt presents today for pap smear and breast exam.  Pt feels like around ovulation time, starts to feel increased anxiety, body aches, \"feels like shes falling apart. \"  Bp elevated today, found out her coworker got hit by car this morning    Last mammogram:  22  Last pap smear:  21  Contraception:  N/A  : 2   Para:  2  AB:  0  Last bone density:  N/A  Last colonoscopy:  22   Patient's last menstrual period was 10/30/2022 (exact date). [id number]    Past Medical History:   Diagnosis Date    Hypertension     Sleep apnea     sleeps with cpap     Past Surgical History:   Procedure Laterality Date    COLONOSCOPY N/A 2022    Dr Gwyn Buck, Mild d-losis, int hem Gr 1, Sub prep fair, 5 year recall    LEEP       Family History   Problem Relation Age of Onset    Heart Disease Paternal Grandmother     Stroke Paternal Grandmother      Social History     Tobacco Use    Smoking status: Never    Smokeless tobacco: Never   Substance Use Topics    Alcohol use: Yes     Alcohol/week: 0.0 standard drinks       Current Outpatient Medications   Medication Sig Dispense Refill    valsartan-hydroCHLOROthiazide (DIOVAN-HCT) 320-12.5 MG per tablet TAKE ONE TABLET BY MOUTH DAILY 90 tablet 1    cloNIDine (CATAPRES) 0.1 MG tablet TAKE ONE TABLET BY MOUTH EVERY NIGHT AT BEDTIME 30 tablet 5    amLODIPine (NORVASC) 5 MG tablet Take one and a half tablet by mouth daily 135 tablet 1     No current facility-administered medications for this visit. Allergies   Allergen Reactions    Latex      Vitals:    10/31/22 0859   BP: (!) 157/97   Pulse: 75     Body mass index is 36.28 kg/m². Review of Systems   Constitutional: Negative. HENT: Negative. Eyes: Negative. Respiratory: Negative. Cardiovascular: Negative. Gastrointestinal: Negative.     Endocrine: Negative. Genitourinary: Negative. Negative for difficulty urinating, dyspareunia, dysuria, enuresis, frequency, hematuria, menstrual problem, pelvic pain, urgency and vaginal discharge. Musculoskeletal:  Positive for arthralgias and myalgias. Skin: Negative. Allergic/Immunologic: Negative. Neurological: Negative. Hematological: Negative. Psychiatric/Behavioral:  The patient is nervous/anxious. Physical Exam  Vitals and nursing note reviewed. Constitutional:       General: She is not in acute distress. Appearance: She is well-developed. She is not diaphoretic. HENT:      Head: Normocephalic and atraumatic. Right Ear: External ear normal.      Left Ear: External ear normal.      Nose: Nose normal.      Mouth/Throat:      Mouth: Mucous membranes are moist.      Pharynx: Oropharynx is clear. Eyes:      General: Lids are normal.         Right eye: No discharge. Left eye: No discharge. Conjunctiva/sclera: Conjunctivae normal.      Pupils: Pupils are equal, round, and reactive to light. Neck:      Thyroid: No thyroid mass or thyromegaly. Trachea: No tracheal deviation. Cardiovascular:      Rate and Rhythm: Normal rate and regular rhythm. Heart sounds: Normal heart sounds. No murmur heard. Pulmonary:      Effort: Pulmonary effort is normal.      Breath sounds: Normal breath sounds. No wheezing. Chest:   Breasts:     Breasts are symmetrical.      Right: No inverted nipple, mass, nipple discharge, skin change or tenderness. Left: No inverted nipple, mass, nipple discharge, skin change or tenderness. Abdominal:      General: Bowel sounds are normal. There is no distension. Palpations: Abdomen is soft. There is no mass. Tenderness: There is no abdominal tenderness. Hernia: There is no hernia in the left inguinal area or right inguinal area. Genitourinary:     General: Normal vulva.       Labia:         Right: No rash, tenderness, lesion or injury. Left: No rash, tenderness, lesion or injury. Urethra: No prolapse, urethral pain, urethral swelling or urethral lesion. Vagina: Bleeding present. No vaginal discharge or tenderness. Cervix: Normal. Discharge: normal cervical mucosa. Uterus: Normal. Not enlarged and not tender. Adnexa: Right adnexa normal and left adnexa normal.        Right: No mass, tenderness or fullness. Left: No mass, tenderness or fullness. Rectum: Normal. No mass, anal fissure or external hemorrhoid. Comments: Pap collected for cervical cytology  Musculoskeletal:         General: No tenderness. Normal range of motion. Cervical back: Normal range of motion and neck supple. Lymphadenopathy:      Cervical:      Right cervical: No superficial, deep or posterior cervical adenopathy. Left cervical: No superficial, deep or posterior cervical adenopathy. Upper Body:      Right upper body: No supraclavicular, pectoral or epitrochlear adenopathy. Left upper body: No supraclavicular, pectoral or epitrochlear adenopathy. Lower Body: No right inguinal adenopathy. No left inguinal adenopathy. Skin:     General: Skin is warm and dry. Capillary Refill: Capillary refill takes 2 to 3 seconds. Findings: No rash. Neurological:      Mental Status: She is alert and oriented to person, place, and time. She is not disoriented. Sensory: No sensory deficit. Coordination: Coordination normal.      Gait: Gait normal.      Deep Tendon Reflexes: Reflexes are normal and symmetric. Psychiatric:         Speech: Speech normal.         Behavior: Behavior normal.         Thought Content: Thought content normal.         Judgment: Judgment normal.        Diagnosis Orders   1. Well woman exam  PAP SMEAR      2. Screening for HPV (human papillomavirus)  Human papillomavirus (HPV) DNA probe thin prep high risk      3.  Screening for cervical cancer  PAP SMEAR 4. Irregular periods            MEDICATIONS:  No orders of the defined types were placed in this encounter. ORDERS:  Orders Placed This Encounter   Procedures    Human papillomavirus (HPV) DNA probe thin prep high risk    PAP SMEAR       PLAN:  Pap collected  Samples of Slynd x 3 given w copay card. To let me know if likes it and script to be sent. Patient Instructions   Patient Education       Well Visit, Ages 25 to 72: Care Instructions  Well visits can help you stay healthy. Your doctor has checked your overall health and may have suggested ways to take good care of yourself. Your doctor also may have recommended tests. You can help prevent illness with healthy eating, good sleep, vaccinations, regular exercise, and other steps. Get the tests that you and your doctor decide on. Depending on your age and risks, examples might include screening for diabetes; hepatitis C; HIV; and cervical, breast, lung, and colon cancer. Screening helps find diseases before any symptoms appear. Eat healthy foods. Choose fruits, vegetables, whole grains, lean protein, and low-fat dairy foods. Limit saturated fat and reduce salt. Limit alcohol. Men should have no more than 2 drinks a day. Women should have no more than 1. For some people, no alcohol is the best choice. Exercise. Get at least 30 minutes of exercise on most days of the week. Walking can be a good choice. Reach and stay at your healthy weight. This will lower your risk for many health problems. Take care of your mental health. Try to stay connected with friends, family, and community, and find ways to manage stress. If you're feeling depressed or hopeless, talk to someone. A counselor can help. If you don't have a counselor, talk to your doctor. Talk to your doctor if you think you may have a problem with alcohol or drug use. This includes prescription medicines and illegal drugs. Avoid tobacco and nicotine: Don't smoke, vape, or chew.  If you need help quitting, talk to your doctor. Practice safer sex. Getting tested, using condoms or dental dams, and limiting sex partners can help prevent STIs. Use birth control if it's important to you to prevent pregnancy. Talk with your doctor about your choices and what might be best for you. Prevent problems where you can. Protect your skin from too much sun, wash your hands, brush your teeth twice a day, and wear a seat belt in the car. Where can you learn more? Go to https://ThinkSmartjasen[employer].healthPirq. org and sign in to your Cherrish account. Enter P072 in the TestCred box to learn more about \"Well Visit, Ages 25 to 72: Care Instructions. \"     If you do not have an account, please click on the \"Sign Up Now\" link. Current as of: March 9, 2022               Content Version: 13.4  © 6450-4011 [employer]. Care instructions adapted under license by [facility] ([facility]). If you have questions about a medical condition or this instruction, always ask your healthcare professional. [place] [address] any warranty or liability for your use of this information. Patient Education        Breast Self-Exam: Care Instructions  Your Care Instructions     A breast self-exam is when you check your breasts for lumps or changes. This regular exam helps you learn how your breasts normally look and feel. Most breast problems or changes are not because of cancer. Breast self-exam is not a substitute for a mammogram. Having regular breast exams by your doctor and regular mammograms improve your chances of finding any problems with your breasts. Some women set a time each month to do a step-by-step breast self-exam. Other women like a less formal system. They might look at their breasts as they brush their teeth, or feel their breasts once in a while in the shower. If you notice a change in your breast, tell your doctor. Follow-up care is a key part of your treatment and safety.  Be sure to make and go to all appointments, and call your doctor if you are having problems. It's also a good idea to know your test results and keep a list of the medicines you take. How do you do a breast self-exam?  The best time to examine your breasts is usually one week after your menstrual period begins. Your breasts should not be tender then. If you do not have periods, you might do your exam on a day of the month that is easy to remember. To examine your breasts:  Remove all your clothes above the waist and lie down. When you are lying down, your breast tissue spreads evenly over your chest wall, which makes it easier to feel all your breast tissue. Use the pads--not the fingertips--of the 3 middle fingers of your left hand to check your right breast. Move your fingers slowly in small coin-sized circles that overlap. Use three levels of pressure to feel of all your breast tissue. Use light pressure to feel the tissue close to the skin surface. Use medium pressure to feel a little deeper. Use firm pressure to feel your tissue close to your breastbone and ribs. Use each pressure level to feel your breast tissue before moving on to the next spot. Check your entire breast, moving up and down as if following a strip from the collarbone to the bra line, and from the armpit to the ribs. Repeat until you have covered the entire breast.  Repeat this procedure for your left breast, using the pads of the 3 middle fingers of your right hand. To examine your breasts while in the shower:  Place one arm over your head and lightly soap your breast on that side. Using the pads of your fingers, gently move your hand over your breast (in the strip pattern described above), feeling carefully for any lumps or changes. Repeat for the other breast.  Have your doctor inspect anything you notice to see if you need further testing. Where can you learn more? Go to https://maria isabel.Origin [email or web address] org and sign in to your MyChart account. Enter P148 in the [facility] box to learn more about \"Breast Self-Exam: Care Instructions. \"     If you do not have an account, please click on the \"Sign Up Now\" link. Current as of: November 22, 2021               Content Version: 13.4  © 8295-8580 Healthwise, Incorporated. Care instructions adapted under license by [facility] ([facility]). If you have questions about a medical condition or this instruction, always ask your healthcare professional. [address] any warranty or liability for your use of this information.

## 2022-10-31 NOTE — PATIENT INSTRUCTIONS
Patient Education        Well Visit, Ages 25 to 72: Care Instructions  Well visits can help you stay healthy. Your doctor has checked your overall health and may have suggested ways to take good care of yourself. Your doctor also may have recommended tests. You can help prevent illness with healthy eating, good sleep, vaccinations, regular exercise, and other steps. Get the tests that you and your doctor decide on. Depending on your age and risks, examples might include screening for diabetes; hepatitis C; HIV; and cervical, breast, lung, and colon cancer. Screening helps find diseases before any symptoms appear. Eat healthy foods. Choose fruits, vegetables, whole grains, lean protein, and low-fat dairy foods. Limit saturated fat and reduce salt. Limit alcohol. Men should have no more than 2 drinks a day. Women should have no more than 1. For some people, no alcohol is the best choice. Exercise. Get at least 30 minutes of exercise on most days of the week. Walking can be a good choice. Reach and stay at your healthy weight. This will lower your risk for many health problems. Take care of your mental health. Try to stay connected with friends, family, and community, and find ways to manage stress. If you're feeling depressed or hopeless, talk to someone. A counselor can help. If you don't have a counselor, talk to your doctor. Talk to your doctor if you think you may have a problem with alcohol or drug use. This includes prescription medicines and illegal drugs. Avoid tobacco and nicotine: Don't smoke, vape, or chew. If you need help quitting, talk to your doctor. Practice safer sex. Getting tested, using condoms or dental dams, and limiting sex partners can help prevent STIs. Use birth control if it's important to you to prevent pregnancy. Talk with your doctor about your choices and what might be best for you. Prevent problems where you can.  Protect your skin from too much sun, wash your hands, brush your teeth twice a day, and wear a seat belt in the car. Where can you learn more? Go to https://chpepiceweb.Ilesfay Technology Group. org and sign in to your [email or web address] account. Enter P072 in the [facility] box to learn more about \"Well Visit, Ages 25 to 72: Care Instructions. \"     If you do not have an account, please click on the \"Sign Up Now\" link. Current as of: March 9, 2022               Content Version: 13.4  © 7019-3107 Vocollect. Care instructions adapted under license by [facility] ([facility]). If you have questions about a medical condition or this instruction, always ask your healthcare professional. [address] any warranty or liability for your use of this information. Patient Education        Breast Self-Exam: Care Instructions  Your Care Instructions     A breast self-exam is when you check your breasts for lumps or changes. This regular exam helps you learn how your breasts normally look and feel. Most breast problems or changes are not because of cancer. Breast self-exam is not a substitute for a mammogram. Having regular breast exams by your doctor and regular mammograms improve your chances of finding any problems with your breasts. Some women set a time each month to do a step-by-step breast self-exam. Other women like a less formal system. They might look at their breasts as they brush their teeth, or feel their breasts once in a while in the shower. If you notice a change in your breast, tell your doctor. Follow-up care is a key part of your treatment and safety. Be sure to make and go to all appointments, and call your doctor if you are having problems. It's also a good idea to know your test results and keep a list of the medicines you take. How do you do a breast self-exam?  The best time to examine your breasts is usually one week after your menstrual period begins. Your breasts should not be tender then.  If you do not have periods, you might do your exam on a day of the month that is easy to remember. To examine your breasts:  Remove all your clothes above the waist and lie down. When you are lying down, your breast tissue spreads evenly over your chest wall, which makes it easier to feel all your breast tissue. Use the pads--not the fingertips--of the 3 middle fingers of your left hand to check your right breast. Move your fingers slowly in small coin-sized circles that overlap. Use three levels of pressure to feel of all your breast tissue. Use light pressure to feel the tissue close to the skin surface. Use medium pressure to feel a little deeper. Use firm pressure to feel your tissue close to your breastbone and ribs. Use each pressure level to feel your breast tissue before moving on to the next spot. Check your entire breast, moving up and down as if following a strip from the collarbone to the bra line, and from the armpit to the ribs. Repeat until you have covered the entire breast.  Repeat this procedure for your left breast, using the pads of the 3 middle fingers of your right hand. To examine your breasts while in the shower:  Place one arm over your head and lightly soap your breast on that side. Using the pads of your fingers, gently move your hand over your breast (in the strip pattern described above), feeling carefully for any lumps or changes. Repeat for the other breast.  Have your doctor inspect anything you notice to see if you need further testing. Where can you learn more? Go to https://Myrio Solutiondayan.PlayCafe. org and sign in to your LaunchSide account. Enter P148 in the [facility] box to learn more about \"Breast Self-Exam: Care Instructions. \"     If you do not have an account, please click on the \"Sign Up Now\" link. Current as of: November 22, 2021               Content Version: 13.4  © 7540-0366 Healthwise, Incorporated. Care instructions adapted under license by [facility] ([facility]).  If you have questions about a medical condition or this instruction, always ask your healthcare professional. [place] [address] any warranty or liability for your use of this information.

## 2022-11-22 ENCOUNTER — OFFICE VISIT (OUTPATIENT)
Dept: PULMONOLOGY | Age: 47
End: 2022-11-22
Payer: COMMERCIAL

## 2022-11-22 VITALS
HEIGHT: 65 IN | WEIGHT: 216 LBS | HEART RATE: 81 BPM | SYSTOLIC BLOOD PRESSURE: 134 MMHG | TEMPERATURE: 97.6 F | DIASTOLIC BLOOD PRESSURE: 84 MMHG | BODY MASS INDEX: 35.99 KG/M2 | OXYGEN SATURATION: 100 %

## 2022-11-22 DIAGNOSIS — E66.01 MORBID OBESITY (HCC): ICD-10-CM

## 2022-11-22 DIAGNOSIS — G47.34 SLEEP RELATED HYPOXIA: ICD-10-CM

## 2022-11-22 DIAGNOSIS — G47.33 OSA (OBSTRUCTIVE SLEEP APNEA): Primary | ICD-10-CM

## 2022-11-22 DIAGNOSIS — G47.10 HYPERSOMNIA: ICD-10-CM

## 2022-11-22 PROCEDURE — 3074F SYST BP LT 130 MM HG: CPT | Performed by: INTERNAL MEDICINE

## 2022-11-22 PROCEDURE — 99214 OFFICE O/P EST MOD 30 MIN: CPT | Performed by: INTERNAL MEDICINE

## 2022-11-22 PROCEDURE — 3078F DIAST BP <80 MM HG: CPT | Performed by: INTERNAL MEDICINE

## 2022-11-22 ASSESSMENT — ENCOUNTER SYMPTOMS
WHEEZING: 0
RHINORRHEA: 0
BACK PAIN: 0
COUGH: 0
ABDOMINAL DISTENTION: 0
ABDOMINAL PAIN: 0
SHORTNESS OF BREATH: 0
CHEST TIGHTNESS: 0
APNEA: 1
ANAL BLEEDING: 0

## 2022-11-22 NOTE — PROGRESS NOTES
Pulmonary and Sleep Medicine    Odalys Martinez (:  1975) is a 56 y.o. female,Established patient, here for evaluation of the following chief complaint(s):  Follow-up (Pt here following up for JUANCHO. )      Referring physician:  No referring provider defined for this encounter. ASSESSMENT/PLAN:  1. JUANCHO (obstructive sleep apnea)  2. Morbid obesity ([address]  3. Hypersomnia  4. Sleep related hypoxia      Continue current management with the CPAP she is compliant with the CPAP and feels it helps. Daniel Lynch [facility]    Return in about 6 months (around 2023). SUBJECTIVE/OBJECTIVE:  She is here for follow up on sleep apnea. She is using the CPAP and is compliant with the CPAP. CPAP compliance data was reviewed. Prior to Visit Medications    Medication Sig Taking? Authorizing Provider   valsartan-hydroCHLOROthiazide (DIOVAN-HCT) 320-12.5 MG per tablet TAKE ONE TABLET BY MOUTH DAILY Yes Dennie Quails, MD   cloNIDine (CATAPRES) 0.1 MG tablet TAKE ONE TABLET BY MOUTH EVERY NIGHT AT BEDTIME Yes Dennie Quails, MD   amLODIPine (NORVASC) 5 MG tablet Take one and a half tablet by mouth daily Yes Dennie Quails, MD        Review of Systems   Constitutional:  Negative for activity change, appetite change, chills, diaphoresis and fatigue. HENT:  Negative for congestion, dental problem, drooling, ear discharge, postnasal drip and rhinorrhea. Eyes:  Negative for visual disturbance. Respiratory:  Positive for apnea. Negative for cough, chest tightness, shortness of breath and wheezing. Gastrointestinal:  Negative for abdominal distention, abdominal pain and anal bleeding. Endocrine: Negative for cold intolerance, heat intolerance and polydipsia. Genitourinary:  Negative for difficulty urinating, dysuria, enuresis and flank pain. Musculoskeletal:  Negative for arthralgias, back pain and gait problem. Allergic/Immunologic: Negative for environmental allergies.    Neurological: Negative for dizziness, facial asymmetry, light-headedness and headaches. Vitals:    11/22/22 1044   BP: 134/84   Pulse: 81   Temp: 97.6 °F (36.4 °C)   SpO2: 100%     BMI Readings from Last 1 Encounters:   11/22/22 35.94 kg/m²         Physical Exam  Vitals reviewed. Constitutional:       Appearance: Normal appearance. HENT:      Head: Normocephalic and atraumatic. Nose: Nose normal.   Eyes:      Extraocular Movements: Extraocular movements intact. Conjunctiva/sclera: Conjunctivae normal.   Cardiovascular:      Rate and Rhythm: Normal rate and regular rhythm. Heart sounds: No murmur heard. No friction rub. Pulmonary:      Effort: Pulmonary effort is normal. No respiratory distress. Breath sounds: Normal breath sounds. No stridor. No wheezing, rhonchi or rales. Abdominal:      General: There is no distension. Palpations: There is no mass. Tenderness: There is no abdominal tenderness. There is no guarding or rebound. Musculoskeletal:      Cervical back: Normal range of motion and neck supple. Neurological:      Mental Status: She is alert and oriented to person, place, and time. This note was generated using a voice recognition software. Errors in voice recognition may have occurred. An electronic signature was used to authenticate this note.     --Ruchi Rodriguez MD

## 2023-01-03 RX ORDER — AMLODIPINE BESYLATE 5 MG/1
TABLET ORAL
Qty: 135 TABLET | Refills: 0 | Status: SHIPPED | OUTPATIENT
Start: 2023-01-03

## 2023-01-03 NOTE — TELEPHONE ENCOUNTER
Last Appointment Date: 3/15/2022  Next Appointment Date: 3/16/2023    Allergies   Allergen Reactions    Latex      Patient needs refill on   Requested Prescriptions     Pending Prescriptions Disp Refills    amLODIPine (NORVASC) 5 MG tablet [Pharmacy Med Name: amLODIPine BESYLATE 5 MG TAB] 135 tablet 1     Sig: TAKE 1 AND 1/2 TABLET BY MOUTH  DAILY

## 2023-01-10 RX ORDER — DROSPIRENONE 4 MG/1
1 TABLET, FILM COATED ORAL DAILY
Qty: 28 TABLET | Refills: 11 | Status: SHIPPED | OUTPATIENT
Start: 2023-01-10

## 2023-01-30 RX ORDER — VALSARTAN AND HYDROCHLOROTHIAZIDE 320; 12.5 MG/1; MG/1
TABLET, FILM COATED ORAL
Qty: 90 TABLET | Refills: 1 | Status: SHIPPED | OUTPATIENT
Start: 2023-01-30

## 2023-01-30 RX ORDER — CLONIDINE HYDROCHLORIDE 0.1 MG/1
TABLET ORAL
Qty: 30 TABLET | Refills: 5 | Status: SHIPPED | OUTPATIENT
Start: 2023-01-30 | End: 2023-01-31

## 2023-01-30 NOTE — TELEPHONE ENCOUNTER
Odalys called requesting a refill of the below medication which has been pended for you:     Requested Prescriptions     Pending Prescriptions Disp Refills    cloNIDine (CATAPRES) 0.1 MG tablet [Pharmacy Med Name: cloNIDine HCL 0.1MG TABLET] 30 tablet 5     Sig: TAKE ONE TABLET BY MOUTH EVERY NIGHT AT BEDTIME    valsartan-hydroCHLOROthiazide (DIOVAN-HCT) 320-12.5 MG per tablet [Pharmacy Med Name: Anastacia [place] 320-12.5 MG TAB] 90 tablet 1     Sig: TAKE ONE TABLET BY MOUTH DAILY       Last Appointment Date: 3/15/2022  Next Appointment Date: 3/16/2023    Allergies   Allergen Reactions    Latex

## 2023-01-31 RX ORDER — CLONIDINE HYDROCHLORIDE 0.1 MG/1
TABLET ORAL
Qty: 30 TABLET | Refills: 5 | Status: SHIPPED | OUTPATIENT
Start: 2023-01-31

## 2023-01-31 NOTE — TELEPHONE ENCOUNTER
Odalys called requesting a refill of the below medication which has been pended for you:     Requested Prescriptions     Pending Prescriptions Disp Refills    cloNIDine (CATAPRES) 0.1 MG tablet [Pharmacy Med Name: cloNIDine HCL 0.1MG TABLET] 30 tablet 5     Sig: TAKE ONE TABLET BY MOUTH EVERY NIGHT AT BEDTIME       Last Appointment Date: 3/15/2022  Next Appointment Date: 3/16/2023    Allergies   Allergen Reactions    Latex

## 2023-02-22 ENCOUNTER — APPOINTMENT (OUTPATIENT)
Dept: GENERAL RADIOLOGY | Facility: HOSPITAL | Age: 48
End: 2023-02-22
Payer: COMMERCIAL

## 2023-02-22 ENCOUNTER — HOSPITAL ENCOUNTER (EMERGENCY)
Facility: HOSPITAL | Age: 48
Discharge: HOME | End: 2023-02-23
Attending: STUDENT IN AN ORGANIZED HEALTH CARE EDUCATION/TRAINING PROGRAM | Admitting: STUDENT IN AN ORGANIZED HEALTH CARE EDUCATION/TRAINING PROGRAM
Payer: COMMERCIAL

## 2023-02-22 VITALS
TEMPERATURE: 97.4 F | HEART RATE: 81 BPM | HEIGHT: 66 IN | RESPIRATION RATE: 18 BRPM | DIASTOLIC BLOOD PRESSURE: 90 MMHG | OXYGEN SATURATION: 95 % | BODY MASS INDEX: 35.36 KG/M2 | WEIGHT: 220 LBS | SYSTOLIC BLOOD PRESSURE: 138 MMHG

## 2023-02-22 DIAGNOSIS — M89.8X1 SHOULDER BLADE PAIN: Primary | ICD-10-CM

## 2023-02-22 LAB
ALBUMIN SERPL-MCNC: 4.7 G/DL (ref 3.5–5.2)
ALBUMIN/GLOB SERPL: 1.6 G/DL
ALP SERPL-CCNC: 57 U/L (ref 39–117)
ALT SERPL W P-5'-P-CCNC: 12 U/L (ref 1–33)
ANION GAP SERPL CALCULATED.3IONS-SCNC: 12 MMOL/L (ref 5–15)
AST SERPL-CCNC: 13 U/L (ref 1–32)
BASOPHILS # BLD AUTO: 0.05 10*3/MM3 (ref 0–0.2)
BASOPHILS NFR BLD AUTO: 0.6 % (ref 0–1.5)
BILIRUB SERPL-MCNC: <0.2 MG/DL (ref 0–1.2)
BUN SERPL-MCNC: 17 MG/DL (ref 6–20)
BUN/CREAT SERPL: 16.8 (ref 7–25)
CALCIUM SPEC-SCNC: 9.4 MG/DL (ref 8.6–10.5)
CHLORIDE SERPL-SCNC: 101 MMOL/L (ref 98–107)
CO2 SERPL-SCNC: 26 MMOL/L (ref 22–29)
CREAT SERPL-MCNC: 1.01 MG/DL (ref 0.57–1)
DEPRECATED RDW RBC AUTO: 38.3 FL (ref 37–54)
EGFRCR SERPLBLD CKD-EPI 2021: 69.2 ML/MIN/1.73
EOSINOPHIL # BLD AUTO: 0.37 10*3/MM3 (ref 0–0.4)
EOSINOPHIL NFR BLD AUTO: 4.1 % (ref 0.3–6.2)
ERYTHROCYTE [DISTWIDTH] IN BLOOD BY AUTOMATED COUNT: 11.9 % (ref 12.3–15.4)
GEN 5 2HR TROPONIN T REFLEX: <6 NG/L
GLOBULIN UR ELPH-MCNC: 2.9 GM/DL
GLUCOSE SERPL-MCNC: 139 MG/DL (ref 65–99)
HCG SERPL QL: NEGATIVE
HCT VFR BLD AUTO: 38.6 % (ref 34–46.6)
HGB BLD-MCNC: 12.9 G/DL (ref 12–15.9)
HOLD SPECIMEN: NORMAL
HOLD SPECIMEN: NORMAL
IMM GRANULOCYTES # BLD AUTO: 0.02 10*3/MM3 (ref 0–0.05)
IMM GRANULOCYTES NFR BLD AUTO: 0.2 % (ref 0–0.5)
LYMPHOCYTES # BLD AUTO: 3.62 10*3/MM3 (ref 0.7–3.1)
LYMPHOCYTES NFR BLD AUTO: 40 % (ref 19.6–45.3)
MCH RBC QN AUTO: 29.3 PG (ref 26.6–33)
MCHC RBC AUTO-ENTMCNC: 33.4 G/DL (ref 31.5–35.7)
MCV RBC AUTO: 87.5 FL (ref 79–97)
MONOCYTES # BLD AUTO: 0.49 10*3/MM3 (ref 0.1–0.9)
MONOCYTES NFR BLD AUTO: 5.4 % (ref 5–12)
NEUTROPHILS NFR BLD AUTO: 4.51 10*3/MM3 (ref 1.7–7)
NEUTROPHILS NFR BLD AUTO: 49.7 % (ref 42.7–76)
NRBC BLD AUTO-RTO: 0 /100 WBC (ref 0–0.2)
PLATELET # BLD AUTO: 341 10*3/MM3 (ref 140–450)
PMV BLD AUTO: 9.1 FL (ref 6–12)
POTASSIUM SERPL-SCNC: 3.4 MMOL/L (ref 3.5–5.2)
PROT SERPL-MCNC: 7.6 G/DL (ref 6–8.5)
RBC # BLD AUTO: 4.41 10*6/MM3 (ref 3.77–5.28)
SODIUM SERPL-SCNC: 139 MMOL/L (ref 136–145)
TROPONIN T DELTA: NORMAL
TROPONIN T SERPL HS-MCNC: <6 NG/L
WBC NRBC COR # BLD: 9.06 10*3/MM3 (ref 3.4–10.8)
WHOLE BLOOD HOLD COAG: NORMAL
WHOLE BLOOD HOLD SPECIMEN: NORMAL

## 2023-02-22 PROCEDURE — 93010 ELECTROCARDIOGRAM REPORT: CPT | Performed by: INTERNAL MEDICINE

## 2023-02-22 PROCEDURE — 84484 ASSAY OF TROPONIN QUANT: CPT | Performed by: STUDENT IN AN ORGANIZED HEALTH CARE EDUCATION/TRAINING PROGRAM

## 2023-02-22 PROCEDURE — 85025 COMPLETE CBC W/AUTO DIFF WBC: CPT | Performed by: STUDENT IN AN ORGANIZED HEALTH CARE EDUCATION/TRAINING PROGRAM

## 2023-02-22 PROCEDURE — 84703 CHORIONIC GONADOTROPIN ASSAY: CPT | Performed by: STUDENT IN AN ORGANIZED HEALTH CARE EDUCATION/TRAINING PROGRAM

## 2023-02-22 PROCEDURE — 93005 ELECTROCARDIOGRAM TRACING: CPT | Performed by: STUDENT IN AN ORGANIZED HEALTH CARE EDUCATION/TRAINING PROGRAM

## 2023-02-22 PROCEDURE — 93005 ELECTROCARDIOGRAM TRACING: CPT

## 2023-02-22 PROCEDURE — 80053 COMPREHEN METABOLIC PANEL: CPT | Performed by: STUDENT IN AN ORGANIZED HEALTH CARE EDUCATION/TRAINING PROGRAM

## 2023-02-22 PROCEDURE — 36415 COLL VENOUS BLD VENIPUNCTURE: CPT

## 2023-02-22 PROCEDURE — 71046 X-RAY EXAM CHEST 2 VIEWS: CPT

## 2023-02-22 PROCEDURE — 99284 EMERGENCY DEPT VISIT MOD MDM: CPT

## 2023-02-22 RX ORDER — VALSARTAN AND HYDROCHLOROTHIAZIDE 160; 25 MG/1; MG/1
1 TABLET ORAL DAILY
COMMUNITY

## 2023-02-22 RX ORDER — ACETAMINOPHEN 500 MG
1000 TABLET ORAL ONCE
Status: COMPLETED | OUTPATIENT
Start: 2023-02-22 | End: 2023-02-22

## 2023-02-22 RX ORDER — AMLODIPINE BESYLATE 10 MG/1
10 TABLET ORAL DAILY
COMMUNITY

## 2023-02-22 RX ADMIN — ACETAMINOPHEN 1000 MG: 500 TABLET, FILM COATED ORAL at 22:23

## 2023-02-25 LAB
QT INTERVAL: 382 MS
QT INTERVAL: 404 MS
QTC INTERVAL: 406 MS
QTC INTERVAL: 406 MS

## 2023-03-02 RX ORDER — AMLODIPINE BESYLATE 5 MG/1
TABLET ORAL
Qty: 135 TABLET | Refills: 1 | Status: SHIPPED | OUTPATIENT
Start: 2023-03-02

## 2023-03-02 NOTE — TELEPHONE ENCOUNTER
Odalys called requesting a refill of the below medication which has been pended for you:     Requested Prescriptions     Pending Prescriptions Disp Refills    amLODIPine (NORVASC) 5 MG tablet [Pharmacy Med Name: amLODIPine BESYLATE 5 MG TAB] 135 tablet 1     Sig: TAKE 1 AND 1/2 TABLET BY MOUTH DAILY       Last Appointment Date: 3/15/2022  Next Appointment Date: 3/16/2023    Allergies   Allergen Reactions    Latex

## 2023-03-10 DIAGNOSIS — E66.09 EXOGENOUS OBESITY: ICD-10-CM

## 2023-03-10 DIAGNOSIS — Z12.11 SCREENING FOR COLON CANCER: ICD-10-CM

## 2023-03-10 DIAGNOSIS — I10 ESSENTIAL HYPERTENSION: ICD-10-CM

## 2023-03-10 DIAGNOSIS — E55.9 VITAMIN D DEFICIENCY: ICD-10-CM

## 2023-03-10 DIAGNOSIS — Z00.00 ANNUAL PHYSICAL EXAM: ICD-10-CM

## 2023-03-10 DIAGNOSIS — Z99.89 OSA ON CPAP: ICD-10-CM

## 2023-03-10 DIAGNOSIS — E78.00 PURE HYPERCHOLESTEROLEMIA: ICD-10-CM

## 2023-03-10 DIAGNOSIS — G47.33 OSA ON CPAP: ICD-10-CM

## 2023-03-10 LAB
ALBUMIN SERPL-MCNC: 4.1 G/DL (ref 3.5–5.2)
ALP BLD-CCNC: 49 U/L (ref 35–104)
ALT SERPL-CCNC: 9 U/L (ref 5–33)
ANION GAP SERPL CALCULATED.3IONS-SCNC: 10 MMOL/L (ref 7–19)
AST SERPL-CCNC: 11 U/L (ref 5–32)
BASOPHILS ABSOLUTE: 0.1 K/UL (ref 0–0.2)
BASOPHILS RELATIVE PERCENT: 0.7 % (ref 0–1)
BILIRUB SERPL-MCNC: 0.3 MG/DL (ref 0.2–1.2)
BILIRUBIN URINE: NEGATIVE
BLOOD, URINE: NEGATIVE
BUN BLDV-MCNC: 12 MG/DL (ref 6–20)
CALCIUM SERPL-MCNC: 9.4 MG/DL (ref 8.6–10)
CHLORIDE BLD-SCNC: 103 MMOL/L (ref 98–111)
CHOLESTEROL, TOTAL: 151 MG/DL (ref 160–199)
CLARITY: CLEAR
CO2: 26 MMOL/L (ref 22–29)
COLOR: YELLOW
CREAT SERPL-MCNC: 1.1 MG/DL (ref 0.5–0.9)
EOSINOPHILS ABSOLUTE: 0.2 K/UL (ref 0–0.6)
EOSINOPHILS RELATIVE PERCENT: 3.2 % (ref 0–5)
GFR SERPL CREATININE-BSD FRML MDRD: >60 ML/MIN/{1.73_M2}
GLUCOSE BLD-MCNC: 103 MG/DL (ref 74–109)
GLUCOSE URINE: NEGATIVE MG/DL
HBA1C MFR BLD: 5.5 % (ref 4–6)
HCT VFR BLD CALC: 37.1 % (ref 37–47)
HDLC SERPL-MCNC: 51 MG/DL (ref 65–121)
HEMOGLOBIN: 12.2 G/DL (ref 12–16)
IMMATURE GRANULOCYTES #: 0 K/UL
KETONES, URINE: NEGATIVE MG/DL
LDL CHOLESTEROL CALCULATED: 78 MG/DL
LEUKOCYTE ESTERASE, URINE: NEGATIVE
LYMPHOCYTES ABSOLUTE: 2.9 K/UL (ref 1.1–4.5)
LYMPHOCYTES RELATIVE PERCENT: 40.9 % (ref 20–40)
MCH RBC QN AUTO: 29.7 PG (ref 27–31)
MCHC RBC AUTO-ENTMCNC: 32.9 G/DL (ref 33–37)
MCV RBC AUTO: 90.3 FL (ref 81–99)
MONOCYTES ABSOLUTE: 0.4 K/UL (ref 0–0.9)
MONOCYTES RELATIVE PERCENT: 5.9 % (ref 0–10)
NEUTROPHILS ABSOLUTE: 3.4 K/UL (ref 1.5–7.5)
NEUTROPHILS RELATIVE PERCENT: 49 % (ref 50–65)
NITRITE, URINE: NEGATIVE
PDW BLD-RTO: 11.9 % (ref 11.5–14.5)
PH UA: 7 (ref 5–8)
PLATELET # BLD: 340 K/UL (ref 130–400)
PMV BLD AUTO: 9.6 FL (ref 9.4–12.3)
POTASSIUM SERPL-SCNC: 4.1 MMOL/L (ref 3.5–5)
PROTEIN UA: NEGATIVE MG/DL
RBC # BLD: 4.11 M/UL (ref 4.2–5.4)
SODIUM BLD-SCNC: 139 MMOL/L (ref 136–145)
SPECIFIC GRAVITY UA: 1.01 (ref 1–1.03)
TOTAL PROTEIN: 6.9 G/DL (ref 6.6–8.7)
TRIGL SERPL-MCNC: 111 MG/DL (ref 0–149)
TSH SERPL DL<=0.05 MIU/L-ACNC: 1.18 UIU/ML (ref 0.27–4.2)
UROBILINOGEN, URINE: 0.2 E.U./DL
WBC # BLD: 7 K/UL (ref 4.8–10.8)

## 2023-03-16 ENCOUNTER — HOSPITAL ENCOUNTER (OUTPATIENT)
Dept: NON INVASIVE DIAGNOSTICS | Age: 48
Discharge: HOME | End: 2023-03-16
Payer: COMMERCIAL

## 2023-03-16 ENCOUNTER — OFFICE VISIT (OUTPATIENT)
Dept: INTERNAL MEDICINE | Age: 48
End: 2023-03-16
Payer: COMMERCIAL

## 2023-03-16 VITALS
SYSTOLIC BLOOD PRESSURE: 128 MMHG | DIASTOLIC BLOOD PRESSURE: 80 MMHG | HEIGHT: 66 IN | OXYGEN SATURATION: 94 % | RESPIRATION RATE: 18 BRPM | WEIGHT: 221 LBS | HEART RATE: 84 BPM | BODY MASS INDEX: 35.52 KG/M2

## 2023-03-16 DIAGNOSIS — G47.33 OSA ON CPAP: ICD-10-CM

## 2023-03-16 DIAGNOSIS — R00.2 PALPITATIONS: ICD-10-CM

## 2023-03-16 DIAGNOSIS — E66.09 EXOGENOUS OBESITY: ICD-10-CM

## 2023-03-16 DIAGNOSIS — E55.9 VITAMIN D DEFICIENCY: ICD-10-CM

## 2023-03-16 DIAGNOSIS — Z00.00 ANNUAL PHYSICAL EXAM: Primary | ICD-10-CM

## 2023-03-16 DIAGNOSIS — R94.31 EKG, ABNORMAL: ICD-10-CM

## 2023-03-16 DIAGNOSIS — E78.00 PURE HYPERCHOLESTEROLEMIA: ICD-10-CM

## 2023-03-16 DIAGNOSIS — I10 ESSENTIAL HYPERTENSION: ICD-10-CM

## 2023-03-16 DIAGNOSIS — Z99.89 OSA ON CPAP: ICD-10-CM

## 2023-03-16 DIAGNOSIS — R07.89 CHEST PRESSURE: ICD-10-CM

## 2023-03-16 DIAGNOSIS — Z12.31 ENCOUNTER FOR SCREENING MAMMOGRAM FOR BREAST CANCER: ICD-10-CM

## 2023-03-16 DIAGNOSIS — Z11.59 NEED FOR HEPATITIS C SCREENING TEST: ICD-10-CM

## 2023-03-16 DIAGNOSIS — M79.602 LEFT ARM PAIN: ICD-10-CM

## 2023-03-16 PROCEDURE — 3074F SYST BP LT 130 MM HG: CPT | Performed by: INTERNAL MEDICINE

## 2023-03-16 PROCEDURE — 99396 PREV VISIT EST AGE 40-64: CPT | Performed by: INTERNAL MEDICINE

## 2023-03-16 PROCEDURE — 93246 EXT ECG>7D<15D RECORDING: CPT

## 2023-03-16 PROCEDURE — 3079F DIAST BP 80-89 MM HG: CPT | Performed by: INTERNAL MEDICINE

## 2023-03-16 SDOH — ECONOMIC STABILITY: HOUSING INSECURITY
IN THE LAST 12 MONTHS, WAS THERE A TIME WHEN YOU DID NOT HAVE A STEADY PLACE TO SLEEP OR SLEPT IN A SHELTER (INCLUDING NOW)?: NO

## 2023-03-16 SDOH — ECONOMIC STABILITY: FOOD INSECURITY: WITHIN THE PAST 12 MONTHS, YOU WORRIED THAT YOUR FOOD WOULD RUN OUT BEFORE YOU GOT MONEY TO BUY MORE.: NEVER TRUE

## 2023-03-16 SDOH — ECONOMIC STABILITY: INCOME INSECURITY: HOW HARD IS IT FOR YOU TO PAY FOR THE VERY BASICS LIKE FOOD, HOUSING, MEDICAL CARE, AND HEATING?: NOT HARD AT ALL

## 2023-03-16 SDOH — ECONOMIC STABILITY: FOOD INSECURITY: WITHIN THE PAST 12 MONTHS, THE FOOD YOU BOUGHT JUST DIDN'T LAST AND YOU DIDN'T HAVE MONEY TO GET MORE.: NEVER TRUE

## 2023-03-16 ASSESSMENT — ENCOUNTER SYMPTOMS
WHEEZING: 0
COUGH: 0
ABDOMINAL PAIN: 0
CHEST TIGHTNESS: 0
CONSTIPATION: 0
SORE THROAT: 0

## 2023-03-16 ASSESSMENT — PATIENT HEALTH QUESTIONNAIRE - PHQ9
SUM OF ALL RESPONSES TO PHQ QUESTIONS 1-9: 0
SUM OF ALL RESPONSES TO PHQ9 QUESTIONS 1 & 2: 0
1. LITTLE INTEREST OR PLEASURE IN DOING THINGS: 0
SUM OF ALL RESPONSES TO PHQ QUESTIONS 1-9: 0
2. FEELING DOWN, DEPRESSED OR HOPELESS: 0
SUM OF ALL RESPONSES TO PHQ QUESTIONS 1-9: 0
SUM OF ALL RESPONSES TO PHQ QUESTIONS 1-9: 0

## 2023-03-16 NOTE — PROGRESS NOTES
Chief Complaint:   Sharyle Fothergill is a 53 y.o. female who presents forcomplete physical exam.    History of Present Illness:      Sharyle Fothergill is a 53 y.o. female who presents todayfor wellness visit AND follow up on her chronic medical conditions as noted below. Patient Active Problem List    Diagnosis Date Noted    Left arm pain 03/16/2023     Priority: Medium    EKG, abnormal 03/16/2023     Priority: Medium    Chest pressure 03/16/2023     Priority: Medium    Sleep related hypoxia 11/22/2022     Priority: Medium    JUANCHO (obstructive sleep apnea) 03/02/2022    Morbid obesity ([address] 03/02/2022    Hypersomnia 03/02/2022    Essential hypertension 11/24/2017    Pure hypercholesterolemia 11/24/2017       Past Medical History:   Diagnosis Date    Hypertension     Sleep apnea     sleeps with cpap       Past Surgical History:   Procedure Laterality Date    COLONOSCOPY N/A 06/07/2022    Dr Rashaad Linares, Mild d-losis, int hem Gr 1, Sub prep fair, 5 year recall    LEEP         Current Outpatient Medications   Medication Sig Dispense Refill    amLODIPine (NORVASC) 5 MG tablet TAKE 1 AND 1/2 TABLET BY MOUTH DAILY 135 tablet 1    cloNIDine (CATAPRES) 0.1 MG tablet TAKE ONE TABLET BY MOUTH EVERY NIGHT AT BEDTIME 30 tablet 5    valsartan-hydroCHLOROthiazide (DIOVAN-HCT) 320-12.5 MG per tablet TAKE ONE TABLET BY MOUTH DAILY 90 tablet 1    Drospirenone (SLYND) 4 MG TABS Take 1 tablet by mouth daily 28 tablet 11     No current facility-administered medications for this visit. Allergies   Allergen Reactions    Latex        Social History     Socioeconomic History    Marital status: Single     Spouse name: None    Number of children: 2    Years of education: None    Highest education level: None   Occupational History    Occupation: community [employer]   Tobacco Use    Smoking status: Never    Smokeless tobacco: Never   Substance and Sexual Activity    Alcohol use:  Yes     Alcohol/week: 0.0 standard drinks    Drug use: No   Social History Narrative    Son born 2009    Daughter 2016     Social Determinants of Health     Financial Resource Strain: Low Risk     Difficulty of Paying Living Expenses: Not hard at all   Food Insecurity: No Food Insecurity    Worried About Running Out of Food in the Last Year: Never true    Ran Out of Food in the Last Year: Never true   Transportation Needs: Unknown    Lack of Transportation (Non-Medical): No   Housing Stability: Unknown    Unstable Housing in the Last Year: No     Family History   Problem Relation Age of Onset    Heart Disease Paternal Grandmother     Stroke Paternal Grandmother           Past Surgical History:   Procedure Laterality Date    COLONOSCOPY N/A 06/07/2022    Dr Miller Cano, Mild d-losis, int hem Gr 1, Sub prep fair, 5 year recall    LEEP           Lab Review   Orders Only on 03/10/2023   Component Date Value    TSH 03/10/2023 1.180     Color, UA 03/10/2023 YELLOW     Clarity, UA 03/10/2023 Clear     Glucose, Ur 03/10/2023 Negative     Bilirubin Urine 03/10/2023 Negative     Ketones, Urine 03/10/2023 Negative     Specific Gravity, UA 03/10/2023 1.014     Blood, Urine 03/10/2023 Negative     pH, UA 03/10/2023 7.0     Protein, UA 03/10/2023 Negative     Urobilinogen, Urine 03/10/2023 0.2     Nitrite, Urine 03/10/2023 Negative     Leukocyte Esterase, Urine 03/10/2023 Negative     Cholesterol, Total 03/10/2023 151 (A)     Triglycerides 03/10/2023 111     HDL 03/10/2023 51 (A)     LDL Calculated 03/10/2023 78     WBC 03/10/2023 7.0     RBC 03/10/2023 4.11 (A)     Hemoglobin 03/10/2023 12.2     Hematocrit 03/10/2023 37.1     MCV 03/10/2023 90.3     MCH 03/10/2023 29.7     MCHC 03/10/2023 32.9 (A)     RDW 03/10/2023 11.9     Platelets 76/90/4889 340     MPV 03/10/2023 9.6     Neutrophils % 03/10/2023 49.0 (A)     Lymphocytes % 03/10/2023 40.9 (A)     Monocytes % 03/10/2023 5.9     Eosinophils % 03/10/2023 3.2     Basophils % 03/10/2023 0.7     Neutrophils Absolute 03/10/2023 3.4     Immature Granulocytes # 03/10/2023 0.0     Lymphocytes Absolute 03/10/2023 2.9     Monocytes Absolute 03/10/2023 0.40     Eosinophils Absolute 03/10/2023 0.20     Basophils Absolute 03/10/2023 0.10     Sodium 03/10/2023 139     Potassium 03/10/2023 4.1     Chloride 03/10/2023 103     CO2 03/10/2023 26     Anion Gap 03/10/2023 10     Glucose 03/10/2023 103     BUN 03/10/2023 12     Creatinine 03/10/2023 1.1 (A)     Est, Glom Filt Rate 03/10/2023 >60     Calcium 03/10/2023 9.4     Total Protein 03/10/2023 6.9     Albumin 03/10/2023 4.1     Total Bilirubin 03/10/2023 0.3     Alkaline Phosphatase 03/10/2023 49     ALT 03/10/2023 9     AST 03/10/2023 11     Hemoglobin A1C 03/10/2023 5.5          Review of Systems   Constitutional:  Negative for chills, fatigue and fever. HENT:  Negative for congestion, ear pain, nosebleeds, postnasal drip and sore throat. Respiratory:  Negative for cough, chest tightness and wheezing. Cardiovascular:  Positive for palpitations. Negative for chest pain and leg swelling. Gastrointestinal:  Negative for abdominal pain and constipation. Genitourinary:  Negative for dysuria and urgency. Musculoskeletal: Negative. Negative for arthralgias. Skin:  Negative for rash. Neurological:  Negative for dizziness and headaches. Psychiatric/Behavioral: Negative. Vitals:    03/16/23 0815   BP: 128/80   Site: Left Upper Arm   Position: Sitting   Cuff Size: Large Adult   Pulse: 84   Resp: 18   SpO2: 94%   Weight: 221 lb (100.2 kg)   Height: 5' 6\" (1.676 m)      Wt Readings from Last 3 Encounters:   03/16/23 221 lb (100.2 kg)   11/22/22 216 lb (98 kg)   10/31/22 218 lb (98.9 kg)   Body mass index is 35.67 kg/m². BP Readings from Last 3 Encounters:   03/16/23 128/80   11/22/22 134/84   10/31/22 (!) 157/97       Physical Exam  Constitutional:       Appearance: She is well-developed. She is obese.    HENT:      Right Ear: External ear normal.      Left Ear: External ear normal. Mouth/Throat:      Pharynx: No oropharyngeal exudate. Eyes:      Conjunctiva/sclera: Conjunctivae normal.      Pupils: Pupils are equal, round, and reactive to light. Neck:      Thyroid: No thyromegaly. Vascular: No JVD. Cardiovascular:      Rate and Rhythm: Normal rate. Heart sounds: Normal heart sounds. No murmur heard. Pulmonary:      Effort: No respiratory distress. Breath sounds: Normal breath sounds. No wheezing or rales. Chest:      Chest wall: No tenderness. Abdominal:      General: Bowel sounds are normal.      Palpations: Abdomen is soft. Musculoskeletal:      Cervical back: Neck supple. Lymphadenopathy:      Cervical: No cervical adenopathy. Skin:     Findings: No rash. Neurological:      Mental Status: She is oriented to person, place, and time. ASSESSMENT/PLAN  Annual physical  * pap per gyn   * mammogram- 2/2021- schedule  * cscope - 6/2022 repeat 5 yrs        Essential hypertension  Blood pressures now have been in good range, patient shows me all her home blood pressure readings  Rx valsartan/HCTZ 320/12.5 p.o. daily  Rx amlodipine 5 mg p.o. daily  Monitor blood pressures at home     Pure hypercholesterolemia-   her LDL 78  Healthy, mostly fiber rich nonstarchy plant-based diet recommended  Recommend to decrease intake of processed foods, simple carbohydrates and animal-based products that high in saturated fats     Obesity  Pt was given counseling about diet and exercise including education on what calories are, where calories come from, the need for portion control,following lower carbohydrate dietary regimen and healthy snacks along side an active lifestyle with supplementary exercise of approx 30 minutes a week, 5 days a week of exercise for weight loss. The patient voiced increased understanding of the topics discussed.           JUANCHO  Now on CPAP  Follows pulmonology  Is having overnight pulse oximetry on CPAP checked since complains of shortness of breath-this is arranged by pulmonology    Panic attacks?- very infrequent but happen at nighttime  Left arm pain  ? Chest pressure  Abnormal EKG-at [facility] emergency room recently  Normal sinus rhythm   Cannot rule out Anterior infarct (cited on or before 26-MAR-2019)   Abnormal ECG   When compared with ECG of 22-FEB-2023 20:22,   No significant change was found   Further evaluation is needed  Obtain ZIO  Obtain nuclear stress test    Orders Placed This Encounter   Procedures    ANTONIETA DIGITAL SCREEN W OR WO CAD BILATERAL    NM MYOCARDIAL SPECT REST EXERCISE OR RX    Hepatitis C Antibody    CBC with Auto Differential    Comprehensive Metabolic Panel    Lipid Panel    Hemoglobin A1C    Urinalysis    TSH    Vitamin D 25 Hydroxy    Longterm Continuous Cardiac Event Monitor (ZIO)       New Prescriptions    No medications on file      There are no Patient Instructions on file for this visit. Return in about 1 year (around 3/16/2024) for Annual Physical.   EMR Dragon/transcription disclaimer:Significant part of this  encounter note is electronic transcription/translation of spoken language to printed text. The electronic translation of spoken language may beerroneous, or at times, nonsensical words or phrases may be inadvertently transcribed.  Although I have reviewed the note for such errors, some may still exist.

## 2023-04-18 ENCOUNTER — HOSPITAL ENCOUNTER (OUTPATIENT)
Dept: NUCLEAR MEDICINE | Age: 48
Discharge: HOME | End: 2023-04-20
Payer: COMMERCIAL

## 2023-04-18 DIAGNOSIS — M79.602 LEFT ARM PAIN: ICD-10-CM

## 2023-04-18 DIAGNOSIS — R07.89 CHEST PRESSURE: ICD-10-CM

## 2023-04-18 DIAGNOSIS — R94.31 EKG, ABNORMAL: ICD-10-CM

## 2023-04-18 LAB
LV EF: 70 %
LVEF MODALITY: NORMAL

## 2023-04-18 PROCEDURE — 93018 CV STRESS TEST I&R ONLY: CPT | Performed by: INTERNAL MEDICINE

## 2023-04-18 PROCEDURE — 93017 CV STRESS TEST TRACING ONLY: CPT

## 2023-04-18 PROCEDURE — 93016 CV STRESS TEST SUPVJ ONLY: CPT | Performed by: INTERNAL MEDICINE

## 2023-04-18 PROCEDURE — 3430000000 HC RX DIAGNOSTIC RADIOPHARMACEUTICAL: Performed by: INTERNAL MEDICINE

## 2023-04-18 PROCEDURE — 78452 HT MUSCLE IMAGE SPECT MULT: CPT | Performed by: INTERNAL MEDICINE

## 2023-04-18 PROCEDURE — A9502 TC99M TETROFOSMIN: HCPCS | Performed by: INTERNAL MEDICINE

## 2023-04-18 RX ADMIN — TETROFOSMIN 24 MILLICURIE: 1.38 INJECTION, POWDER, LYOPHILIZED, FOR SOLUTION INTRAVENOUS at 12:01

## 2023-04-18 RX ADMIN — TETROFOSMIN 8 MILLICURIE: 1.38 INJECTION, POWDER, LYOPHILIZED, FOR SOLUTION INTRAVENOUS at 10:00

## 2023-04-19 DIAGNOSIS — R94.31 EKG, ABNORMAL: ICD-10-CM

## 2023-04-19 DIAGNOSIS — R94.39 ABNORMAL STRESS TEST: Primary | ICD-10-CM

## 2023-04-19 DIAGNOSIS — M79.602 LEFT ARM PAIN: ICD-10-CM

## 2023-05-03 ENCOUNTER — OFFICE VISIT (OUTPATIENT)
Dept: CARDIOLOGY CLINIC | Age: 48
End: 2023-05-03
Payer: COMMERCIAL

## 2023-05-03 VITALS
HEIGHT: 66 IN | BODY MASS INDEX: 35.03 KG/M2 | DIASTOLIC BLOOD PRESSURE: 88 MMHG | WEIGHT: 218 LBS | SYSTOLIC BLOOD PRESSURE: 134 MMHG | HEART RATE: 57 BPM

## 2023-05-03 DIAGNOSIS — R00.0 RACING HEART BEAT: ICD-10-CM

## 2023-05-03 DIAGNOSIS — I10 ESSENTIAL HYPERTENSION: ICD-10-CM

## 2023-05-03 DIAGNOSIS — Z99.89 OSA ON CPAP: ICD-10-CM

## 2023-05-03 DIAGNOSIS — G47.33 OSA ON CPAP: ICD-10-CM

## 2023-05-03 DIAGNOSIS — R00.0 TACHYCARDIA: ICD-10-CM

## 2023-05-03 DIAGNOSIS — R94.39 ABNORMAL STRESS TEST: Primary | ICD-10-CM

## 2023-05-03 PROCEDURE — 99204 OFFICE O/P NEW MOD 45 MIN: CPT | Performed by: NURSE PRACTITIONER

## 2023-05-03 PROCEDURE — 3079F DIAST BP 80-89 MM HG: CPT | Performed by: NURSE PRACTITIONER

## 2023-05-03 PROCEDURE — 3075F SYST BP GE 130 - 139MM HG: CPT | Performed by: NURSE PRACTITIONER

## 2023-05-03 PROCEDURE — 93000 ELECTROCARDIOGRAM COMPLETE: CPT | Performed by: NURSE PRACTITIONER

## 2023-05-03 RX ORDER — METOPROLOL SUCCINATE 25 MG/1
12.5 TABLET, EXTENDED RELEASE ORAL DAILY
Qty: 30 TABLET | Refills: 3 | Status: SHIPPED | OUTPATIENT
Start: 2023-05-03

## 2023-05-03 NOTE — PATIENT INSTRUCTIONS
Start Toprol 12.5 mg daily    Hypertension  (High Blood Pressure)  Most people with high blood pressure (hypertension) have no symptoms. High blood pressure can be a dangerous problem. Hypertension is dangerous because you may have it and not know it. High blood pressure may mean that your heart needs to work harder to pump blood. Your blood pressure is measured with 2 numbers. The first number is when your heart flexes (contracts), and the second number is when your heart relaxes. The higher the numbers are, the more you are at risk for problems. Write down your blood pressure today. The best blood pressure for adults is 120/80 (mmHg) or lower. It is likely that your blood pressure was recorded at least 2 times today. It is important for you to give these numbers to your doctor. If you do not have a doctor, try to get follow-up care at a hospital or community clinic. You may need to start high blood pressure medicine. You may also need to adjust your medicines as told by your doctor. Even mild high blood pressure increases long-term health risks. One high reading does not mean you have hypertension. Your blood pressure should be taken when you are relaxed. It is also important to sit for about 10 minutes before being tested. Things that can increase your blood pressure are:  Injury, Illness, Stress, Caffeine, and some medicines (like decongestants). High blood pressure does not usually need emergency treatment. HOME CARE  Lifestyle and medicine changes may be needed, including:   Weight loss. Exercise. Limit the use of salt. Stop smoking. If using decongestants or birth control pills, talk to your doctor. These medicines might make blood pressure higher. Do not use street drugs. Females should not drink more than 1 alcoholic drink per day. Males should not drink more than 2 alcoholic drinks per day. Take your blood pressure medicine. You will need to take it every day.  If you do not

## 2023-05-23 ENCOUNTER — OFFICE VISIT (OUTPATIENT)
Dept: PULMONOLOGY | Age: 48
End: 2023-05-23
Payer: COMMERCIAL

## 2023-05-23 ENCOUNTER — TELEPHONE (OUTPATIENT)
Dept: PSYCHIATRY | Age: 48
End: 2023-05-23

## 2023-05-23 VITALS
OXYGEN SATURATION: 99 % | HEART RATE: 72 BPM | WEIGHT: 216.4 LBS | SYSTOLIC BLOOD PRESSURE: 144 MMHG | HEIGHT: 65 IN | TEMPERATURE: 97.4 F | DIASTOLIC BLOOD PRESSURE: 86 MMHG | BODY MASS INDEX: 36.06 KG/M2

## 2023-05-23 DIAGNOSIS — G47.10 HYPERSOMNIA: ICD-10-CM

## 2023-05-23 DIAGNOSIS — F51.03 PARADOXICAL INSOMNIA: ICD-10-CM

## 2023-05-23 DIAGNOSIS — G47.34 SLEEP RELATED HYPOXIA: ICD-10-CM

## 2023-05-23 DIAGNOSIS — E66.01 MORBID OBESITY (HCC): ICD-10-CM

## 2023-05-23 DIAGNOSIS — G47.33 OSA (OBSTRUCTIVE SLEEP APNEA): Primary | ICD-10-CM

## 2023-05-23 DIAGNOSIS — F41.9 ANXIETY: ICD-10-CM

## 2023-05-23 PROCEDURE — 3079F DIAST BP 80-89 MM HG: CPT | Performed by: INTERNAL MEDICINE

## 2023-05-23 PROCEDURE — 3077F SYST BP >= 140 MM HG: CPT | Performed by: INTERNAL MEDICINE

## 2023-05-23 PROCEDURE — 99214 OFFICE O/P EST MOD 30 MIN: CPT | Performed by: INTERNAL MEDICINE

## 2023-05-23 RX ORDER — TRAZODONE HYDROCHLORIDE 50 MG/1
50 TABLET ORAL NIGHTLY PRN
Qty: 30 TABLET | Refills: 5 | Status: SHIPPED | OUTPATIENT
Start: 2023-05-23

## 2023-05-23 ASSESSMENT — ENCOUNTER SYMPTOMS
COUGH: 0
ANAL BLEEDING: 0
ABDOMINAL DISTENTION: 0
SHORTNESS OF BREATH: 0
WHEEZING: 0
BACK PAIN: 0
RHINORRHEA: 0
APNEA: 1
ABDOMINAL PAIN: 0
CHEST TIGHTNESS: 0

## 2023-05-23 NOTE — PROGRESS NOTES
Pulmonary and Sleep Medicine    Odalys Martinez (:  1975) is a 53 y.o. female,Established patient, here for evaluation of the following chief complaint(s):  Follow-up (Follow up- JUANCHO )      Referring physician:  No referring provider defined for this encounter. ASSESSMENT/PLAN:  1. JUANCHO (obstructive sleep apnea)  2. Morbid obesity ([address]  3. Hypersomnia  4. Sleep related hypoxia  5. Paradoxical insomnia  -     traZODone (DESYREL) 50 MG tablet; Take 1 tablet by mouth nightly as needed for Sleep, Disp-30 tablet, R-5Normal  -     [facility]  [address]. [facility]        Continue CPAP she is compliant with the CPAP and she feels it helps her. Will refer to psych for further evaluation. Will start on Trazodone. Brian Nolasco MD, [facility], [facility]    Return in about 3 months (around 2023). SUBJECTIVE/OBJECTIVE:  She is here for follow up on sleep apnea. She is using the CPAP and is compliant with the CPAP and feels it helps. She wakes up with panic attacks and feels smothery and takes the CPAP off. She took one of her brother's trazodone's and felt it helped her. She thinks she took half a 50mg tablet. Prior to Visit Medications    Medication Sig Taking? Authorizing Provider   metoprolol succinate (TOPROL XL) 25 MG extended release tablet Take 0.5 tablets by mouth daily Yes HECTOR Musa   amLODIPine (NORVASC) 5 MG tablet TAKE 1 AND 1/2 TABLET BY MOUTH DAILY Yes Kostas Field MD   cloNIDine (CATAPRES) 0.1 MG tablet TAKE ONE TABLET BY MOUTH EVERY NIGHT AT BEDTIME Yes Kostas Field MD   valsartan-hydroCHLOROthiazide (DIOVAN-HCT) 320-12.5 MG per tablet TAKE ONE TABLET BY MOUTH DAILY Yes Kostas Field MD   Drospirenone (SLYND) 4 MG TABS Take 1 tablet by mouth daily Yes HECTOR Silveira        Review of Systems   Constitutional:  Negative for activity change, appetite change, chills, diaphoresis and fatigue.    HENT:  Negative for congestion,

## 2023-05-23 NOTE — TELEPHONE ENCOUNTER
Called to schedule an appt from a referral    Scheduled with Dr. Jasson Perry for 05/24/23 @ 11.     Electronically signed by Shirley Hanna MA on 5/23/2023 at 4:39 PM

## 2023-05-24 ENCOUNTER — OFFICE VISIT (OUTPATIENT)
Dept: PSYCHIATRY | Age: 48
End: 2023-05-24

## 2023-05-24 VITALS
SYSTOLIC BLOOD PRESSURE: 151 MMHG | HEART RATE: 65 BPM | HEIGHT: 66 IN | TEMPERATURE: 99.5 F | DIASTOLIC BLOOD PRESSURE: 97 MMHG | WEIGHT: 208.6 LBS | OXYGEN SATURATION: 95 % | BODY MASS INDEX: 33.52 KG/M2

## 2023-05-24 DIAGNOSIS — F41.1 GENERALIZED ANXIETY DISORDER WITH PANIC ATTACKS: Primary | ICD-10-CM

## 2023-05-24 DIAGNOSIS — G47.00 INSOMNIA, UNSPECIFIED TYPE: ICD-10-CM

## 2023-05-24 DIAGNOSIS — F41.0 GENERALIZED ANXIETY DISORDER WITH PANIC ATTACKS: Primary | ICD-10-CM

## 2023-05-24 DIAGNOSIS — Z00.00 HEALTHCARE MAINTENANCE: ICD-10-CM

## 2023-05-24 RX ORDER — DESVENLAFAXINE 25 MG/1
25 TABLET, EXTENDED RELEASE ORAL DAILY
Qty: 30 TABLET | Refills: 1 | Status: SHIPPED | OUTPATIENT
Start: 2023-05-24 | End: 2023-06-23

## 2023-05-24 ASSESSMENT — PATIENT HEALTH QUESTIONNAIRE - PHQ9
6. FEELING BAD ABOUT YOURSELF - OR THAT YOU ARE A FAILURE OR HAVE LET YOURSELF OR YOUR FAMILY DOWN: 0
2. FEELING DOWN, DEPRESSED OR HOPELESS: 1
8. MOVING OR SPEAKING SO SLOWLY THAT OTHER PEOPLE COULD HAVE NOTICED. OR THE OPPOSITE, BEING SO FIGETY OR RESTLESS THAT YOU HAVE BEEN MOVING AROUND A LOT MORE THAN USUAL: 0
10. IF YOU CHECKED OFF ANY PROBLEMS, HOW DIFFICULT HAVE THESE PROBLEMS MADE IT FOR YOU TO DO YOUR WORK, TAKE CARE OF THINGS AT HOME, OR GET ALONG WITH OTHER PEOPLE: 1
9. THOUGHTS THAT YOU WOULD BE BETTER OFF DEAD, OR OF HURTING YOURSELF: 0
3. TROUBLE FALLING OR STAYING ASLEEP: 2
1. LITTLE INTEREST OR PLEASURE IN DOING THINGS: 2
SUM OF ALL RESPONSES TO PHQ QUESTIONS 1-9: 7
5. POOR APPETITE OR OVEREATING: 0
SUM OF ALL RESPONSES TO PHQ9 QUESTIONS 1 & 2: 3
SUM OF ALL RESPONSES TO PHQ QUESTIONS 1-9: 7
SUM OF ALL RESPONSES TO PHQ QUESTIONS 1-9: 7
4. FEELING TIRED OR HAVING LITTLE ENERGY: 1
SUM OF ALL RESPONSES TO PHQ QUESTIONS 1-9: 7
7. TROUBLE CONCENTRATING ON THINGS, SUCH AS READING THE NEWSPAPER OR WATCHING TELEVISION: 1

## 2023-05-24 NOTE — PROGRESS NOTES
PSYCHIATRIC EVALUATION    Date of Service:  5/24/2023    Chief Complaint   Patient presents with    Medication Check       HISTORY OF PRESENT ILLNESS  50-year-old white female with history of insomnia, anxiety, obstructive sleep apnea on CPAP, presents to [facility], referred by  by Dr. Frank Inman. Previous records and labs reviewed. Just started on trazodone by Dr. Frank Inman. Patient states she took a dose last night but did not experience any significant improvement. She takes Benadryl for allergies. Noted high blood pressure today. Discussed with patient. Patient is calm and cooperative today. Pleasant. States she has been struggling with anxiety. Started having panic attacks at night about 2 years ago. She was then diagnosed with severe sleep apnea. States she is compliant with CPAP but wakes up some nights with a panic attack and takes it off. States she feels refreshed in the morning most days. She struggles with falling asleep and staying asleep. Denies depression. Finds herself anxious and unable to relax throughout the day. No panic attacks during the day. Some minor life stressors related to taking care of the kids. Good relationship with  and parents who help with the children. Enjoys her work. No significant history of trauma. She is practicing yoga twice a week. We discussed the need for psychotherapy. She was receptive. Discussed medication management. She believes she may be getting close to menopause. Still having periods. Agreed to try Pristiq. We will continue on trazodone but will try higher dose. PSYCHIATRIC HISTORY  Diagnoses: Denies  Suicide attempts/gestures: Denies   Prior hospitalizations: Denies   Medication trials: Denies   Mental health contact: Denies  Head trauma: Denies     SUBSTANCE USE HISTORY  Denies alcohol and illicit drug use. Denies tobacco use. FAMILY PSYCHIATRIC HISTORY  Mother and aunt with history of depression.   No history of

## 2023-05-26 ENCOUNTER — TELEPHONE (OUTPATIENT)
Dept: PSYCHIATRY | Age: 48
End: 2023-05-26

## 2023-05-26 DIAGNOSIS — F41.1 GENERALIZED ANXIETY DISORDER WITH PANIC ATTACKS: Primary | ICD-10-CM

## 2023-05-26 DIAGNOSIS — F41.0 GENERALIZED ANXIETY DISORDER WITH PANIC ATTACKS: Primary | ICD-10-CM

## 2023-05-26 NOTE — TELEPHONE ENCOUNTER
Sent Referral to [facility] for therapy    Electronically signed by Tara Montenegro MA on 5/26/2023 at 2:57 PM

## 2023-06-02 ENCOUNTER — TELEPHONE (OUTPATIENT)
Dept: PSYCHIATRY | Age: 48
End: 2023-06-02

## 2023-06-20 ENCOUNTER — TELEPHONE (OUTPATIENT)
Dept: PSYCHIATRY | Age: 48
End: 2023-06-20

## 2023-06-20 NOTE — TELEPHONE ENCOUNTER
Called and confirmed appt with pt for 6/21 @ 9:30 AM    Electronically signed by Coty Rowland on 6/20/2023 at 1:53 PM

## 2023-06-21 ENCOUNTER — OFFICE VISIT (OUTPATIENT)
Dept: PSYCHIATRY | Age: 48
End: 2023-06-21
Payer: COMMERCIAL

## 2023-06-21 VITALS
DIASTOLIC BLOOD PRESSURE: 99 MMHG | OXYGEN SATURATION: 100 % | TEMPERATURE: 97.9 F | BODY MASS INDEX: 33.78 KG/M2 | HEART RATE: 63 BPM | HEIGHT: 66 IN | WEIGHT: 210.2 LBS | SYSTOLIC BLOOD PRESSURE: 149 MMHG

## 2023-06-21 DIAGNOSIS — F41.1 GENERALIZED ANXIETY DISORDER WITH PANIC ATTACKS: Primary | ICD-10-CM

## 2023-06-21 DIAGNOSIS — F41.0 GENERALIZED ANXIETY DISORDER WITH PANIC ATTACKS: Primary | ICD-10-CM

## 2023-06-21 DIAGNOSIS — G47.00 INSOMNIA, UNSPECIFIED TYPE: ICD-10-CM

## 2023-06-21 PROCEDURE — 99214 OFFICE O/P EST MOD 30 MIN: CPT | Performed by: PSYCHIATRY & NEUROLOGY

## 2023-06-21 PROCEDURE — 3080F DIAST BP >= 90 MM HG: CPT | Performed by: PSYCHIATRY & NEUROLOGY

## 2023-06-21 PROCEDURE — 3077F SYST BP >= 140 MM HG: CPT | Performed by: PSYCHIATRY & NEUROLOGY

## 2023-06-21 ASSESSMENT — PATIENT HEALTH QUESTIONNAIRE - PHQ9
SUM OF ALL RESPONSES TO PHQ QUESTIONS 1-9: 2
SUM OF ALL RESPONSES TO PHQ QUESTIONS 1-9: 3
8. MOVING OR SPEAKING SO SLOWLY THAT OTHER PEOPLE COULD HAVE NOTICED. OR THE OPPOSITE, BEING SO FIGETY OR RESTLESS THAT YOU HAVE BEEN MOVING AROUND A LOT MORE THAN USUAL: 0
10. IF YOU CHECKED OFF ANY PROBLEMS, HOW DIFFICULT HAVE THESE PROBLEMS MADE IT FOR YOU TO DO YOUR WORK, TAKE CARE OF THINGS AT HOME, OR GET ALONG WITH OTHER PEOPLE: 0
3. TROUBLE FALLING OR STAYING ASLEEP: 1
SUM OF ALL RESPONSES TO PHQ QUESTIONS 1-9: 3
5. POOR APPETITE OR OVEREATING: 0
4. FEELING TIRED OR HAVING LITTLE ENERGY: 1
7. TROUBLE CONCENTRATING ON THINGS, SUCH AS READING THE NEWSPAPER OR WATCHING TELEVISION: 0
SUM OF ALL RESPONSES TO PHQ QUESTIONS 1-9: 3
1. LITTLE INTEREST OR PLEASURE IN DOING THINGS: 0
9. THOUGHTS THAT YOU WOULD BE BETTER OFF DEAD, OR OF HURTING YOURSELF: 1
2. FEELING DOWN, DEPRESSED OR HOPELESS: 0
SUM OF ALL RESPONSES TO PHQ9 QUESTIONS 1 & 2: 0
6. FEELING BAD ABOUT YOURSELF - OR THAT YOU ARE A FAILURE OR HAVE LET YOURSELF OR YOUR FAMILY DOWN: 0

## 2023-06-21 ASSESSMENT — COLUMBIA-SUICIDE SEVERITY RATING SCALE - C-SSRS
6. HAVE YOU EVER DONE ANYTHING, STARTED TO DO ANYTHING, OR PREPARED TO DO ANYTHING TO END YOUR LIFE?: NO
2. HAVE YOU ACTUALLY HAD ANY THOUGHTS OF KILLING YOURSELF?: NO
1. WITHIN THE PAST MONTH, HAVE YOU WISHED YOU WERE DEAD OR WISHED YOU COULD GO TO SLEEP AND NOT WAKE UP?: NO

## 2023-06-21 NOTE — PROGRESS NOTES
6/21/2023 10:06 AM   Progress Note          Odalys VERA Juan 1975      Chief Complaint   Patient presents with    Medication Check         Subjective:    60-year-old white female with history of insomnia, anxiety, obstructive sleep apnea on CPAP, presents for follow up. Previous records and labs reviewed. On trazodone by Dr. Fernando Coleman. Noted high blood pressure today. Discussed with patient. Calm and cooperative. Smiling. States Pristiq makes her dizzy. No change in her mood or anxiety. She is not depressed. Anxiety is still high. No recent panic attacks. Has not started seeing a therapist yet. Her first appointment is today. We discussed the need to focus on therapy. Patient states she has control issues. Discussed books on CBT by Tia Antoine. Patient states she will order copies of the books.       BP: BP (!) 149/99   Pulse 63   Temp 97.9 °F (36.6 °C)   Ht 5' 6\" (1.676 m)   Wt 210 lb 3.2 oz (95.3 kg)   SpO2 100%   BMI 33.93 kg/m²       Review of Systems - 14 point review:  Negative except for    Constitutional: (fevers, chills, night sweats, wt loss/gain, change in appetite, fatigue, somnolence)    HEENT: (ear pain or discharge, hearing loss, ear ringing, sinus pressure, nosebleed, nasal discharge, sore throat, oral sores, tooth pain, bleeding gums, hoarse voice, neck pain)      Cardiovascular: (HTN, chest pain, elevated cholesterol/lipids, palpitations, leg swelling, leg pain with walking)    Respiratory: (cough, wheezing, snoring, SOB with activity (dyspnea), SOB while lying flat (orthopnea), awakening with severe SOB (paroxysmal nocturnal dyspnea))    Gastrointestinal: (NVD, constipation, abdominal pain, bright red stools, black tarry stools, stool incontinence)     Genitourinary:  (pelvic pain, burning or frequency of urination, urinary urgency, blood in urine incomplete bladder emptying, urinary incontinence, STD; MEN: testicular pain or swelling, erectile dysfunction; WOMEN: LMP, heavy

## 2023-06-22 ENCOUNTER — HOSPITAL ENCOUNTER (OUTPATIENT)
Dept: WOMENS IMAGING | Age: 48
Discharge: HOME | End: 2023-06-22
Payer: COMMERCIAL

## 2023-06-22 DIAGNOSIS — Z12.31 ENCOUNTER FOR SCREENING MAMMOGRAM FOR BREAST CANCER: ICD-10-CM

## 2023-06-22 PROCEDURE — 77067 SCR MAMMO BI INCL CAD: CPT | Performed by: RADIOLOGY

## 2023-06-22 PROCEDURE — 77063 BREAST TOMOSYNTHESIS BI: CPT

## 2023-07-05 DIAGNOSIS — R92.8 ABNORMAL MAMMOGRAM OF LEFT BREAST: ICD-10-CM

## 2023-07-05 DIAGNOSIS — N63.21 MASS OF UPPER OUTER QUADRANT OF LEFT BREAST: Primary | ICD-10-CM

## 2023-07-07 ENCOUNTER — HOSPITAL ENCOUNTER (OUTPATIENT)
Dept: WOMENS IMAGING | Age: 48
Discharge: HOME | End: 2023-07-07
Payer: COMMERCIAL

## 2023-07-07 DIAGNOSIS — R92.8 ABNORMAL MAMMOGRAM OF LEFT BREAST: ICD-10-CM

## 2023-07-07 DIAGNOSIS — N63.21 MASS OF UPPER OUTER QUADRANT OF LEFT BREAST: ICD-10-CM

## 2023-07-07 PROCEDURE — 76642 ULTRASOUND BREAST LIMITED: CPT

## 2023-07-07 PROCEDURE — G0279 TOMOSYNTHESIS, MAMMO: HCPCS

## 2023-07-10 DIAGNOSIS — Z12.31 ENCOUNTER FOR SCREENING MAMMOGRAM FOR BREAST CANCER: Primary | ICD-10-CM

## 2023-08-16 ENCOUNTER — OFFICE VISIT (OUTPATIENT)
Dept: CARDIOLOGY CLINIC | Age: 48
End: 2023-08-16
Payer: COMMERCIAL

## 2023-08-16 VITALS
DIASTOLIC BLOOD PRESSURE: 86 MMHG | HEIGHT: 66 IN | BODY MASS INDEX: 34.87 KG/M2 | HEART RATE: 70 BPM | SYSTOLIC BLOOD PRESSURE: 132 MMHG | WEIGHT: 217 LBS

## 2023-08-16 DIAGNOSIS — I10 ESSENTIAL HYPERTENSION: Primary | ICD-10-CM

## 2023-08-16 PROCEDURE — 3079F DIAST BP 80-89 MM HG: CPT | Performed by: INTERNAL MEDICINE

## 2023-08-16 PROCEDURE — 3075F SYST BP GE 130 - 139MM HG: CPT | Performed by: INTERNAL MEDICINE

## 2023-08-16 PROCEDURE — 99214 OFFICE O/P EST MOD 30 MIN: CPT | Performed by: INTERNAL MEDICINE

## 2023-08-16 ASSESSMENT — ENCOUNTER SYMPTOMS
SHORTNESS OF BREATH: 0
COUGH: 0
ABDOMINAL DISTENTION: 0
BACK PAIN: 0
EYE DISCHARGE: 0
WHEEZING: 0
VOMITING: 0
BLOOD IN STOOL: 0
CONSTIPATION: 0
DIARRHEA: 0

## 2023-08-16 NOTE — PROGRESS NOTES
present. No diastolic murmur is present. No friction rub. No gallop. No S3 or S4 sounds. Comments: No JVD  No edema  No significant systolic or diastolic murmurs noted  Pulmonary:      Effort: Pulmonary effort is normal. No respiratory distress. Breath sounds: Normal breath sounds. No wheezing or rales. Chest:      Chest wall: No tenderness. Abdominal:      General: Bowel sounds are normal. There is no distension. Palpations: Abdomen is soft. There is no mass. Tenderness: There is no abdominal tenderness. There is no guarding or rebound. Hernia: No hernia is present. Comments: No palpable organomegaly   Musculoskeletal:         General: Normal range of motion. Skin:     General: Skin is warm. Coloration: Skin is not pale. Findings: No rash. Neurological:      Mental Status: She is alert and oriented to person, place, and time. Cranial Nerves: No cranial nerve deficit. Deep Tendon Reflexes: Reflexes normal.         Labs:   CBC: No results for input(s): WBC, HGB, HCT, PLT in the last 72 hours. BMP:No results for input(s): NA, K, CO2, BUN, CREATININE, LABGLOM, GLUCOSE in the last 72 hours. BNP: No results for input(s): BNP in the last 72 hours. PT/INR: No results for input(s): PROTIME, INR in the last 72 hours. APTT:No results for input(s): APTT in the last 72 hours. CARDIAC ENZYMES:No results for input(s): CKTOTAL, CKMB, CKMBINDEX, TROPONINI in the last 72 hours. FASTING LIPID PANEL:  Lab Results   Component Value Date/Time    HDL 51 03/10/2023 08:01 AM    LDLCALC 78 03/10/2023 08:01 AM    TRIG 111 03/10/2023 08:01 AM     LIVER PROFILE:No results for input(s): AST, ALT, LABALBU in the last 72 hours. Imaging:    Treadmill  Nuclear Stress Test Report  Procedure date: 4/18/2023  Indications: chest pain  Procedure: Stress was performed using a standard Bryce protocol. Vital  signs and EKG were monitored.  Technetium-99 Myoview was injected

## 2023-08-22 ENCOUNTER — HOSPITAL ENCOUNTER (OUTPATIENT)
Dept: NON INVASIVE DIAGNOSTICS | Age: 48
Discharge: HOME | End: 2023-08-22
Payer: COMMERCIAL

## 2023-08-22 DIAGNOSIS — I10 ESSENTIAL HYPERTENSION: ICD-10-CM

## 2023-08-22 LAB
LV EF: 58 %
LVEF MODALITY: NORMAL

## 2023-08-22 PROCEDURE — 93306 TTE W/DOPPLER COMPLETE: CPT

## 2023-08-24 ENCOUNTER — TELEPHONE (OUTPATIENT)
Dept: CARDIOLOGY CLINIC | Age: 48
End: 2023-08-24

## 2023-08-24 NOTE — TELEPHONE ENCOUNTER
----- Message from Ayush Abarca MD sent at 8/23/2023  4:58 PM CDT -----  Please let patient know that her echocardiogram shows normal heart function. Left ventricle wall thickness is slightly increased related to hypertension. Blood pressure control. Follow-up as per prior appointment.

## 2023-08-29 RX ORDER — VALSARTAN AND HYDROCHLOROTHIAZIDE 320; 12.5 MG/1; MG/1
TABLET, FILM COATED ORAL
Qty: 90 TABLET | Refills: 1 | Status: SHIPPED | OUTPATIENT
Start: 2023-08-29

## 2023-08-29 RX ORDER — AMLODIPINE BESYLATE 5 MG/1
TABLET ORAL
Qty: 135 TABLET | Refills: 1 | Status: SHIPPED | OUTPATIENT
Start: 2023-08-29

## 2023-09-15 ENCOUNTER — TELEPHONE (OUTPATIENT)
Dept: PSYCHIATRY | Age: 48
End: 2023-09-15

## 2023-09-15 NOTE — TELEPHONE ENCOUNTER
Pt cancelled her appt on 9/21 @ 9:30 AM due  to work     [employer] and rescheduled appt for 10/2 @ 9 AM    Electronically signed by Mara Desai on 9/15/2023 at 2:22 PM

## 2023-09-25 ENCOUNTER — TELEPHONE (OUTPATIENT)
Dept: PSYCHIATRY | Age: 48
End: 2023-09-25

## 2023-09-25 NOTE — TELEPHONE ENCOUNTER
Appointment canceled for Tra Gallegos ([id number])   Visit Type: FOLLOW UP   Date        Time      Length    Provider                  Department   10/2/2023    9:00 AM  30 mins. Dr. Karen Arias MD   [address] [place] [address]      Reason for Cancellation: Feeling better/No longer need this appointment     Pt canceled appt through [employer].     Electronically signed by Daniel Wise MA on 9/25/2023 at 4:26 PM

## 2023-11-03 RX ORDER — DROSPIRENONE 4 MG/1
1 TABLET, FILM COATED ORAL DAILY
Qty: 84 TABLET | Refills: 0 | Status: SHIPPED | OUTPATIENT
Start: 2023-11-03

## 2023-11-14 ENCOUNTER — OFFICE VISIT (OUTPATIENT)
Dept: CARDIOLOGY CLINIC | Age: 48
End: 2023-11-14

## 2023-11-14 VITALS
BODY MASS INDEX: 33.91 KG/M2 | OXYGEN SATURATION: 100 % | DIASTOLIC BLOOD PRESSURE: 82 MMHG | HEART RATE: 81 BPM | WEIGHT: 211 LBS | SYSTOLIC BLOOD PRESSURE: 132 MMHG | HEIGHT: 66 IN

## 2023-11-14 DIAGNOSIS — G47.33 OSA ON CPAP: ICD-10-CM

## 2023-11-14 DIAGNOSIS — I10 ESSENTIAL HYPERTENSION: Primary | ICD-10-CM

## 2023-11-14 DIAGNOSIS — I47.10 SVT (SUPRAVENTRICULAR TACHYCARDIA): ICD-10-CM

## 2023-11-14 DIAGNOSIS — R00.0 RACING HEART BEAT: ICD-10-CM

## 2023-11-14 NOTE — PROGRESS NOTES
Assessment, Recommendations, & Plan:  53 y.o. female with HTN,  SVT, Heart racing, JUANCHO, &  CPAP    HTN-Controlled on Toprol, Norvasc, Diovan, & hydrochlorothiazide. Continue current regimen    SVT/Heart racing-Resolved on Toprol. Continue current regimen    JUANCHO- Followed by pulmonology      Disposition - RTC as scheduled with Dr. Liborio Vizcaino or sooner if needed      Please do not hesitate to contact me for any questions or concerns.     Sincerely yours,    HECTOR Nagy

## 2023-11-14 NOTE — PATIENT INSTRUCTIONS
including:   Heart disease. Stroke. Kidney failure. See your doctor as told. GET HELP RIGHT AWAY IF:  You get a very bad headache. You get blurred or changing vision. You feel confused. You feel weak, numb, or faint. You get chest or belly (abdominal) pain. You throw up (vomit). You cannot breathe very well. If you have a blood pressure reading with a top number of 180 or higher, you need to see your doctor right away. This is especially true if you are having any of the problems listed above.

## 2023-11-15 ENCOUNTER — OFFICE VISIT (OUTPATIENT)
Dept: OBGYN CLINIC | Age: 48
End: 2023-11-15
Payer: COMMERCIAL

## 2023-11-15 VITALS
BODY MASS INDEX: 33.59 KG/M2 | SYSTOLIC BLOOD PRESSURE: 134 MMHG | WEIGHT: 209 LBS | DIASTOLIC BLOOD PRESSURE: 89 MMHG | HEIGHT: 66 IN | HEART RATE: 64 BPM

## 2023-11-15 DIAGNOSIS — Z12.31 SCREENING MAMMOGRAM FOR BREAST CANCER: ICD-10-CM

## 2023-11-15 DIAGNOSIS — Z11.51 SCREENING FOR HPV (HUMAN PAPILLOMAVIRUS): ICD-10-CM

## 2023-11-15 DIAGNOSIS — Z12.39 ENCOUNTER FOR SCREENING BREAST EXAMINATION: ICD-10-CM

## 2023-11-15 DIAGNOSIS — Z12.4 SCREENING FOR CERVICAL CANCER: ICD-10-CM

## 2023-11-15 DIAGNOSIS — Z30.41 ORAL CONTRACEPTIVE PILL SURVEILLANCE: ICD-10-CM

## 2023-11-15 DIAGNOSIS — Z01.419 WELL WOMAN EXAM: Primary | ICD-10-CM

## 2023-11-15 LAB — HPV16+18+H RISK 12 DNA SPEC-IMP: NORMAL

## 2023-11-15 PROCEDURE — 3075F SYST BP GE 130 - 139MM HG: CPT | Performed by: NURSE PRACTITIONER

## 2023-11-15 PROCEDURE — 3079F DIAST BP 80-89 MM HG: CPT | Performed by: NURSE PRACTITIONER

## 2023-11-15 PROCEDURE — 99396 PREV VISIT EST AGE 40-64: CPT | Performed by: NURSE PRACTITIONER

## 2023-11-15 RX ORDER — DROSPIRENONE 4 MG/1
1 TABLET, FILM COATED ORAL DAILY
Qty: 84 TABLET | Refills: 3 | Status: SHIPPED | OUTPATIENT
Start: 2023-11-15

## 2023-11-15 ASSESSMENT — ENCOUNTER SYMPTOMS
EYES NEGATIVE: 1
ALLERGIC/IMMUNOLOGIC NEGATIVE: 1
GASTROINTESTINAL NEGATIVE: 1
RESPIRATORY NEGATIVE: 1

## 2023-11-15 NOTE — PROGRESS NOTES
Diagnosis Orders   1. Well woman exam  PAP SMEAR    Human papillomavirus (HPV) DNA probe thin prep high risk      2. Screening for HPV (human papillomavirus)  PAP SMEAR    Human papillomavirus (HPV) DNA probe thin prep high risk      3. Screening for cervical cancer  PAP SMEAR      4. Encounter for screening breast examination        5. Screening mammogram for breast cancer        6. Oral contraceptive pill surveillance  Drospirenone (SLYND) 4 MG TABS          MEDICATIONS:  Orders Placed This Encounter   Medications    Drospirenone (SLYND) 4 MG TABS     Sig: Take 1 tablet by mouth daily     Dispense:  84 tablet     Refill:  3       ORDERS:  Orders Placed This Encounter   Procedures    PAP SMEAR    Human papillomavirus (HPV) DNA probe thin prep high risk       PLAN:  Pap collected  Ocp refilled  I have discussed w pt she needs to let pcp be aware this is a long term issues for her with pain and ?? Lab workup for inflammation? She doesn't have hot flashes so likely years from menopause.    Rtc in 1 year or prn if decides to try hrt instead of ocp  Patient Instructions   mammogram

## 2023-11-21 LAB
HPV HR 12 DNA SPEC QL NAA+PROBE: NOT DETECTED
HPV16 DNA SPEC QL NAA+PROBE: NOT DETECTED
HPV16+18+H RISK 12 DNA SPEC-IMP: NORMAL
HPV18 DNA SPEC QL NAA+PROBE: NOT DETECTED

## 2023-12-02 DIAGNOSIS — F51.03 PARADOXICAL INSOMNIA: ICD-10-CM

## 2023-12-04 RX ORDER — TRAZODONE HYDROCHLORIDE 50 MG/1
TABLET ORAL
Qty: 30 TABLET | Refills: 11 | Status: SHIPPED | OUTPATIENT
Start: 2023-12-04

## 2024-01-08 ENCOUNTER — HOSPITAL ENCOUNTER (OUTPATIENT)
Dept: WOMENS IMAGING | Age: 49
Discharge: HOME | End: 2024-01-08
Payer: COMMERCIAL

## 2024-01-08 DIAGNOSIS — Z12.31 ENCOUNTER FOR SCREENING MAMMOGRAM FOR BREAST CANCER: ICD-10-CM

## 2024-01-08 PROCEDURE — G0279 TOMOSYNTHESIS, MAMMO: HCPCS

## 2024-02-26 RX ORDER — VALSARTAN AND HYDROCHLOROTHIAZIDE 320; 12.5 MG/1; MG/1
TABLET, FILM COATED ORAL
Qty: 90 TABLET | Refills: 0 | Status: SHIPPED | OUTPATIENT
Start: 2024-02-26

## 2024-02-26 RX ORDER — AMLODIPINE BESYLATE 5 MG/1
TABLET ORAL
Qty: 135 TABLET | Refills: 0 | Status: SHIPPED | OUTPATIENT
Start: 2024-02-26

## 2024-02-26 NOTE — TELEPHONE ENCOUNTER
Odalys VERA Juan called to request a refill on her medication.      Last office visit : 3/16/2023   Next office visit : 3/18/2024     Requested Prescriptions     Pending Prescriptions Disp Refills    valsartan-hydroCHLOROthiazide (DIOVAN-HCT) 320-12.5 MG per tablet [Pharmacy Med Name: VALSARTAN-HCTZ 320-12.5 MG TAB] 90 tablet 1     Sig: TAKE 1 TABLET BY MOUTH DAILY    amLODIPine (NORVASC) 5 MG tablet [Pharmacy Med Name: amLODIPine BESYLATE 5 MG TAB] 135 tablet 1     Sig: TAKE 1 AND 1/2 TABLET BY MOUTH DAILY            Mary Montero MA

## 2024-03-13 DIAGNOSIS — Z00.00 ANNUAL PHYSICAL EXAM: ICD-10-CM

## 2024-03-13 DIAGNOSIS — E66.09 EXOGENOUS OBESITY: ICD-10-CM

## 2024-03-13 DIAGNOSIS — G47.33 OSA ON CPAP: ICD-10-CM

## 2024-03-13 DIAGNOSIS — E55.9 VITAMIN D DEFICIENCY: ICD-10-CM

## 2024-03-13 DIAGNOSIS — M79.602 LEFT ARM PAIN: ICD-10-CM

## 2024-03-13 DIAGNOSIS — Z12.31 ENCOUNTER FOR SCREENING MAMMOGRAM FOR BREAST CANCER: ICD-10-CM

## 2024-03-13 DIAGNOSIS — E78.00 PURE HYPERCHOLESTEROLEMIA: ICD-10-CM

## 2024-03-13 DIAGNOSIS — R00.2 PALPITATIONS: ICD-10-CM

## 2024-03-13 DIAGNOSIS — R07.89 CHEST PRESSURE: ICD-10-CM

## 2024-03-13 DIAGNOSIS — R94.31 EKG, ABNORMAL: ICD-10-CM

## 2024-03-13 DIAGNOSIS — Z11.59 NEED FOR HEPATITIS C SCREENING TEST: ICD-10-CM

## 2024-03-13 DIAGNOSIS — I10 ESSENTIAL HYPERTENSION: ICD-10-CM

## 2024-03-13 LAB
25(OH)D3 SERPL-MCNC: 54.8 NG/ML
ALBUMIN SERPL-MCNC: 4.4 G/DL (ref 3.5–5.2)
ALP SERPL-CCNC: 56 U/L (ref 35–104)
ALT SERPL-CCNC: 10 U/L (ref 5–33)
ANION GAP SERPL CALCULATED.3IONS-SCNC: 10 MMOL/L (ref 7–19)
AST SERPL-CCNC: 12 U/L (ref 5–32)
BASOPHILS # BLD: 0 K/UL (ref 0–0.2)
BASOPHILS NFR BLD: 0.5 % (ref 0–1)
BILIRUB SERPL-MCNC: 0.4 MG/DL (ref 0.2–1.2)
BILIRUB UR QL STRIP: NEGATIVE
BUN SERPL-MCNC: 12 MG/DL (ref 6–20)
CALCIUM SERPL-MCNC: 9.5 MG/DL (ref 8.6–10)
CHLORIDE SERPL-SCNC: 102 MMOL/L (ref 98–111)
CHOLEST SERPL-MCNC: 209 MG/DL (ref 160–199)
CLARITY UR: CLEAR
CO2 SERPL-SCNC: 26 MMOL/L (ref 22–29)
COLOR UR: YELLOW
CREAT SERPL-MCNC: 1 MG/DL (ref 0.5–0.9)
EOSINOPHIL # BLD: 0.2 K/UL (ref 0–0.6)
EOSINOPHIL NFR BLD: 2.5 % (ref 0–5)
ERYTHROCYTE [DISTWIDTH] IN BLOOD BY AUTOMATED COUNT: 11.7 % (ref 11.5–14.5)
GLUCOSE SERPL-MCNC: 96 MG/DL (ref 74–109)
GLUCOSE UR STRIP.AUTO-MCNC: NEGATIVE MG/DL
HBA1C MFR BLD: 5.5 % (ref 4–6)
HCT VFR BLD AUTO: 40.6 % (ref 37–47)
HCV AB SERPL QL IA: NORMAL
HDLC SERPL-MCNC: 50 MG/DL (ref 65–121)
HGB BLD-MCNC: 13.5 G/DL (ref 12–16)
HGB UR STRIP.AUTO-MCNC: NEGATIVE MG/L
IMM GRANULOCYTES # BLD: 0 K/UL
KETONES UR STRIP.AUTO-MCNC: NEGATIVE MG/DL
LDLC SERPL CALC-MCNC: 125 MG/DL
LEUKOCYTE ESTERASE UR QL STRIP.AUTO: NEGATIVE
LYMPHOCYTES # BLD: 3.3 K/UL (ref 1.1–4.5)
LYMPHOCYTES NFR BLD: 40.8 % (ref 20–40)
MCH RBC QN AUTO: 29.4 PG (ref 27–31)
MCHC RBC AUTO-ENTMCNC: 33.3 G/DL (ref 33–37)
MCV RBC AUTO: 88.5 FL (ref 81–99)
MONOCYTES # BLD: 0.4 K/UL (ref 0–0.9)
MONOCYTES NFR BLD: 5.2 % (ref 0–10)
NEUTROPHILS # BLD: 4.1 K/UL (ref 1.5–7.5)
NEUTS SEG NFR BLD: 50.8 % (ref 50–65)
NITRITE UR QL STRIP.AUTO: NEGATIVE
PH UR STRIP.AUTO: 5.5 [PH] (ref 5–8)
PLATELET # BLD AUTO: 323 K/UL (ref 130–400)
PMV BLD AUTO: 9.7 FL (ref 9.4–12.3)
POTASSIUM SERPL-SCNC: 4.1 MMOL/L (ref 3.5–5)
PROT SERPL-MCNC: 7.4 G/DL (ref 6.6–8.7)
PROT UR STRIP.AUTO-MCNC: NEGATIVE MG/DL
RBC # BLD AUTO: 4.59 M/UL (ref 4.2–5.4)
SODIUM SERPL-SCNC: 138 MMOL/L (ref 136–145)
SP GR UR STRIP.AUTO: 1.01 (ref 1–1.03)
TRIGL SERPL-MCNC: 169 MG/DL (ref 0–149)
TSH SERPL DL<=0.005 MIU/L-ACNC: 1.53 UIU/ML (ref 0.27–4.2)
UROBILINOGEN UR STRIP.AUTO-MCNC: 0.2 E.U./DL
WBC # BLD AUTO: 8.1 K/UL (ref 4.8–10.8)

## 2024-03-18 ENCOUNTER — OFFICE VISIT (OUTPATIENT)
Dept: INTERNAL MEDICINE | Age: 49
End: 2024-03-18
Payer: COMMERCIAL

## 2024-03-18 VITALS
SYSTOLIC BLOOD PRESSURE: 124 MMHG | OXYGEN SATURATION: 99 % | HEART RATE: 70 BPM | BODY MASS INDEX: 33.94 KG/M2 | HEIGHT: 66 IN | DIASTOLIC BLOOD PRESSURE: 88 MMHG | WEIGHT: 211.2 LBS

## 2024-03-18 DIAGNOSIS — G47.33 OSA ON CPAP: ICD-10-CM

## 2024-03-18 DIAGNOSIS — Z12.31 ENCOUNTER FOR SCREENING MAMMOGRAM FOR MALIGNANT NEOPLASM OF BREAST: Primary | ICD-10-CM

## 2024-03-18 DIAGNOSIS — E55.9 VITAMIN D DEFICIENCY: ICD-10-CM

## 2024-03-18 DIAGNOSIS — I10 ESSENTIAL HYPERTENSION: ICD-10-CM

## 2024-03-18 DIAGNOSIS — Z00.00 ANNUAL PHYSICAL EXAM: ICD-10-CM

## 2024-03-18 DIAGNOSIS — R79.82 ELEVATED C-REACTIVE PROTEIN (CRP): ICD-10-CM

## 2024-03-18 LAB — CRP SERPL HS-MCNC: 0.44 MG/DL (ref 0–0.5)

## 2024-03-18 PROCEDURE — 99396 PREV VISIT EST AGE 40-64: CPT | Performed by: INTERNAL MEDICINE

## 2024-03-18 PROCEDURE — 3074F SYST BP LT 130 MM HG: CPT | Performed by: INTERNAL MEDICINE

## 2024-03-18 PROCEDURE — 3079F DIAST BP 80-89 MM HG: CPT | Performed by: INTERNAL MEDICINE

## 2024-03-18 RX ORDER — METHOCARBAMOL 500 MG/1
500 TABLET, FILM COATED ORAL NIGHTLY PRN
Qty: 30 TABLET | Refills: 1 | Status: SHIPPED | OUTPATIENT
Start: 2024-03-18 | End: 2024-04-17

## 2024-03-18 SDOH — ECONOMIC STABILITY: FOOD INSECURITY: WITHIN THE PAST 12 MONTHS, YOU WORRIED THAT YOUR FOOD WOULD RUN OUT BEFORE YOU GOT MONEY TO BUY MORE.: NEVER TRUE

## 2024-03-18 SDOH — ECONOMIC STABILITY: FOOD INSECURITY: WITHIN THE PAST 12 MONTHS, THE FOOD YOU BOUGHT JUST DIDN'T LAST AND YOU DIDN'T HAVE MONEY TO GET MORE.: NEVER TRUE

## 2024-03-18 SDOH — ECONOMIC STABILITY: INCOME INSECURITY: HOW HARD IS IT FOR YOU TO PAY FOR THE VERY BASICS LIKE FOOD, HOUSING, MEDICAL CARE, AND HEATING?: NOT HARD AT ALL

## 2024-03-18 ASSESSMENT — PATIENT HEALTH QUESTIONNAIRE - PHQ9
SUM OF ALL RESPONSES TO PHQ QUESTIONS 1-9: 0
1. LITTLE INTEREST OR PLEASURE IN DOING THINGS: NOT AT ALL
SUM OF ALL RESPONSES TO PHQ9 QUESTIONS 1 & 2: 0
2. FEELING DOWN, DEPRESSED OR HOPELESS: NOT AT ALL
SUM OF ALL RESPONSES TO PHQ QUESTIONS 1-9: 0

## 2024-03-18 ASSESSMENT — ENCOUNTER SYMPTOMS
SORE THROAT: 0
COUGH: 0
CHEST TIGHTNESS: 0
ABDOMINAL PAIN: 0
CONSTIPATION: 0
WHEEZING: 0

## 2024-03-18 NOTE — PROGRESS NOTES
Chief Complaint:   Odalys Martinez is a 49 y.o. female who presents forcomplete physical exam.    History of Present Illness:      Odalys Martinez is a 49 y.o. female who presents todayfor wellness visit AND follow up on her chronic medical conditions as noted below.    Patient Active Problem List    Diagnosis Date Noted    Left arm pain 03/16/2023     Priority: Medium    EKG, abnormal 03/16/2023     Priority: Medium    Chest pressure 03/16/2023     Priority: Medium    Sleep related hypoxia 11/22/2022     Priority: Medium    Paradoxical insomnia 05/23/2023    Anxiety 05/23/2023    JUANCHO (obstructive sleep apnea) 03/02/2022    Morbid obesity (HCC) 03/02/2022    Hypersomnia 03/02/2022    Essential hypertension 11/24/2017    Pure hypercholesterolemia 11/24/2017       Past Medical History:   Diagnosis Date    Hypertension     Sleep apnea     sleeps with cpap       Past Surgical History:   Procedure Laterality Date    COLONOSCOPY N/A 06/07/2022    Dr Aguilar, Mild d-losis, int hem Gr 1, Sub prep fair, 5 year recall    LEEP         Current Outpatient Medications   Medication Sig Dispense Refill    methocarbamol (ROBAXIN) 500 MG tablet Take 1 tablet by mouth nightly as needed (chest wall pain) 30 tablet 1    valsartan-hydroCHLOROthiazide (DIOVAN-HCT) 320-12.5 MG per tablet TAKE 1 TABLET BY MOUTH DAILY 90 tablet 0    amLODIPine (NORVASC) 5 MG tablet TAKE 1 AND 1/2 TABLET BY MOUTH DAILY 135 tablet 0    traZODone (DESYREL) 50 MG tablet TAKE ONE TABLET BY MOUTH NIGHTLY AS NEEDED FOR SLEEP 30 tablet 11    Drospirenone (SLYND) 4 MG TABS Take 1 tablet by mouth daily 84 tablet 3    metoprolol succinate (TOPROL XL) 25 MG extended release tablet Take 0.5 tablets by mouth daily 30 tablet 3     No current facility-administered medications for this visit.     Allergies   Allergen Reactions    Latex        Social History     Socioeconomic History    Marital status: Single     Spouse name: None    Number of children: 2    Years of

## 2024-04-04 ENCOUNTER — TELEPHONE (OUTPATIENT)
Dept: CARDIOLOGY CLINIC | Age: 49
End: 2024-04-04

## 2024-04-04 NOTE — TELEPHONE ENCOUNTER
Called patient and left a VM for them to r/s 4/19 appt. Dr. Rey is out may r/s with Yifan Cid 4/4/24 AH

## 2024-04-06 ENCOUNTER — PATIENT MESSAGE (OUTPATIENT)
Dept: INTERNAL MEDICINE | Age: 49
End: 2024-04-06

## 2024-04-06 DIAGNOSIS — R07.89 CHEST WALL PAIN: ICD-10-CM

## 2024-04-06 DIAGNOSIS — M54.9 MID BACK PAIN: Primary | ICD-10-CM

## 2024-04-08 NOTE — TELEPHONE ENCOUNTER
From: Odalys Martinez  To: Dr. Janny Iraheta  Sent: 4/6/2024 6:59 PM CDT  Subject: Rib cage pain     The muscle relaxers don’t help the rib cage pain. Now it’s in the center of my back and right shoulder. I also had what I thought was a stomach bug 4 days ago with vomiting and diarrhea. It only lasted 12 hrs but uncomfortable bloating and gas continue. Can you test for gallbladder function/dysfuction? Thank you, Odalys

## 2024-04-08 NOTE — TELEPHONE ENCOUNTER
This is from 03/18/2024:      Chest wall pain- radiates bilateral chest wall lower ribs  Suggest back stretces hourly at work  Insstruction to pt  Also RX  robaxin prn hs

## 2024-06-18 RX ORDER — VALSARTAN AND HYDROCHLOROTHIAZIDE 320; 12.5 MG/1; MG/1
TABLET, FILM COATED ORAL
Qty: 90 TABLET | Refills: 3 | Status: SHIPPED | OUTPATIENT
Start: 2024-06-18

## 2024-06-18 NOTE — TELEPHONE ENCOUNTER
Odalys Martinez called to request a refill on her medication.      Last office visit : 3/18/2024   Next office visit : 3/18/2025    Requested Prescriptions     Pending Prescriptions Disp Refills    valsartan-hydroCHLOROthiazide (DIOVAN-HCT) 320-12.5 MG per tablet [Pharmacy Med Name: VALSARTAN-HCTZ 320-12.5 MG TAB] 90 tablet 0     Sig: TAKE 1 TABLET BY MOUTH DAILY            Mary Montero MA

## 2024-07-18 NOTE — TELEPHONE ENCOUNTER
Last OV 3/18/2024  Next OV03/18/25      Requested Prescriptions     Pending Prescriptions Disp Refills    amLODIPine (NORVASC) 5 MG tablet [Pharmacy Med Name: AMLODIPINE BESYLATE 5 MG TAB] 135 tablet 0     Sig: TAKE 1 AND 1/2 TABLET BY MOUTH DAILY

## 2024-07-19 RX ORDER — AMLODIPINE BESYLATE 5 MG/1
TABLET ORAL
Qty: 135 TABLET | Refills: 0 | Status: SHIPPED | OUTPATIENT
Start: 2024-07-19

## 2024-10-15 RX ORDER — AMLODIPINE BESYLATE 5 MG/1
TABLET ORAL
Qty: 135 TABLET | Refills: 0 | Status: SHIPPED | OUTPATIENT
Start: 2024-10-15

## 2024-10-15 NOTE — TELEPHONE ENCOUNTER
Odalys Martinez called to request a refill on her medication.      Last office visit : 3/18/2024   Next office visit : 3/18/2025     Requested Prescriptions     Pending Prescriptions Disp Refills    amLODIPine (NORVASC) 5 MG tablet [Pharmacy Med Name: AMLODIPINE BESYLATE 5 MG TAB] 135 tablet 0     Sig: TAKE 1 AND 1/2 TABLET BY MOUTH DAILY            Mary Montero MA

## 2024-11-19 RX ORDER — VALSARTAN AND HYDROCHLOROTHIAZIDE 320; 12.5 MG/1; MG/1
TABLET, FILM COATED ORAL
Qty: 90 TABLET | Refills: 1 | Status: SHIPPED | OUTPATIENT
Start: 2024-11-19

## 2024-11-19 RX ORDER — AMLODIPINE BESYLATE 5 MG/1
TABLET ORAL
Qty: 135 TABLET | Refills: 1 | Status: SHIPPED | OUTPATIENT
Start: 2024-11-19

## 2024-11-19 NOTE — TELEPHONE ENCOUNTER
Odalys Martinez called to request a refill on her medication.      Last office visit : 3/18/2024   Next office visit : 3/18/2025     Requested Prescriptions     Pending Prescriptions Disp Refills    amLODIPine (NORVASC) 5 MG tablet [Pharmacy Med Name: AMLODIPINE BESYLATE TABS 5MG]  0    valsartan-hydroCHLOROthiazide (DIOVAN-HCT) 320-12.5 MG per tablet [Pharmacy Med Name: VALSARTAN/HCTZ TABS 320/12.5MG]  0            Mary Montero MA

## 2024-11-21 NOTE — PROGRESS NOTES
Odalys Martinez is a 49 y.o. female who presents today for her medical conditions/ complaints as noted below. Odalys Martinez is c/o of Annual Exam        HPI  Pt presents today for pap smear and breast exam.  Has started pelvic floor therapy for leakage.     Mammo: 2024  Pap smear: 2023  Contraception: none     P: 2  Ab: 0  Bone density: never   Colonoscopy:   Patient's last menstrual period was 2024.      Past Medical History:   Diagnosis Date    Hypertension     Sleep apnea     sleeps with cpap     Past Surgical History:   Procedure Laterality Date    COLONOSCOPY N/A 2022    Dr Aguilar, Mild d-losis, int hem Gr 1, Sub prep fair, 5 year recall    LEEP       Family History   Problem Relation Age of Onset    No Known Problems Mother     No Known Problems Father     Heart Disease Paternal Grandmother     Stroke Paternal Grandmother      Social History     Tobacco Use    Smoking status: Never    Smokeless tobacco: Never   Substance Use Topics    Alcohol use: Not Currently       Current Outpatient Medications   Medication Sig Dispense Refill    amLODIPine (NORVASC) 5 MG tablet Take 1 and 1/2 tablet by mouth daily 135 tablet 1    valsartan-hydroCHLOROthiazide (DIOVAN-HCT) 320-12.5 MG per tablet Take 1 tablet by mouth daily 90 tablet 1    traZODone (DESYREL) 50 MG tablet TAKE ONE TABLET BY MOUTH NIGHTLY AS NEEDED FOR SLEEP 30 tablet 11     No current facility-administered medications for this visit.     Allergies   Allergen Reactions    Latex      Vitals:    24 0921   BP: (!) 148/96   Pulse: 65     Body mass index is 36.48 kg/m².    Review of Systems   Constitutional: Negative.    HENT: Negative.     Eyes: Negative.    Respiratory: Negative.     Cardiovascular: Negative.    Gastrointestinal: Negative.    Endocrine: Negative.    Genitourinary:  Positive for frequency (and leakage occasionally, mostly around time for periods.). Negative for difficulty urinating, dyspareunia, dysuria,

## 2024-11-21 NOTE — PATIENT INSTRUCTIONS
much sun, wash your hands, brush your teeth twice a day, and wear a seat belt in the car.   Where can you learn more?  Go to https://www.[email or web address].net/patientEd and enter P072 to learn more about \"Well Visit, Ages 18 to 65: Care Instructions.\"  Current as of: August 6, 2023  Content Version: 14.2  © 2024 [employer].   Care instructions adapted under license by [employer]. If you have questions about a medical condition or this instruction, always ask your healthcare professional. [employer] disclaims any warranty or liability for your use of this information.

## 2024-11-22 ENCOUNTER — OFFICE VISIT (OUTPATIENT)
Dept: OBGYN CLINIC | Age: 49
End: 2024-11-22

## 2024-11-22 VITALS
WEIGHT: 226 LBS | DIASTOLIC BLOOD PRESSURE: 96 MMHG | SYSTOLIC BLOOD PRESSURE: 148 MMHG | HEIGHT: 66 IN | HEART RATE: 65 BPM | BODY MASS INDEX: 36.32 KG/M2

## 2024-11-22 DIAGNOSIS — Z01.419 WELL WOMAN EXAM: Primary | ICD-10-CM

## 2024-11-22 DIAGNOSIS — Z11.51 SCREENING FOR HPV (HUMAN PAPILLOMAVIRUS): ICD-10-CM

## 2024-11-22 DIAGNOSIS — N39.42 URINARY INCONTINENCE WITHOUT SENSORY AWARENESS: ICD-10-CM

## 2024-11-22 DIAGNOSIS — N39.3 STRESS INCONTINENCE OF URINE: ICD-10-CM

## 2024-11-22 DIAGNOSIS — Z12.31 SCREENING MAMMOGRAM FOR BREAST CANCER: ICD-10-CM

## 2024-11-22 DIAGNOSIS — Z12.4 SCREENING FOR CERVICAL CANCER: ICD-10-CM

## 2024-11-22 LAB — HPV16+18+H RISK 12 DNA SPEC-IMP: NORMAL

## 2024-11-22 ASSESSMENT — ENCOUNTER SYMPTOMS
ALLERGIC/IMMUNOLOGIC NEGATIVE: 1
RESPIRATORY NEGATIVE: 1
EYES NEGATIVE: 1
GASTROINTESTINAL NEGATIVE: 1

## 2025-02-18 DIAGNOSIS — F51.03 PARADOXICAL INSOMNIA: ICD-10-CM

## 2025-02-18 RX ORDER — TRAZODONE HYDROCHLORIDE 50 MG/1
50 TABLET ORAL NIGHTLY PRN
Qty: 90 TABLET | OUTPATIENT
Start: 2025-02-18

## 2025-05-09 ENCOUNTER — OFFICE VISIT (OUTPATIENT)
Dept: CARDIOLOGY CLINIC | Age: 50
End: 2025-05-09
Payer: COMMERCIAL

## 2025-05-09 VITALS
DIASTOLIC BLOOD PRESSURE: 102 MMHG | HEART RATE: 60 BPM | HEIGHT: 66 IN | SYSTOLIC BLOOD PRESSURE: 150 MMHG | WEIGHT: 229 LBS | BODY MASS INDEX: 36.8 KG/M2

## 2025-05-09 DIAGNOSIS — I47.10 SVT (SUPRAVENTRICULAR TACHYCARDIA): Primary | ICD-10-CM

## 2025-05-09 PROCEDURE — 3077F SYST BP >= 140 MM HG: CPT | Performed by: INTERNAL MEDICINE

## 2025-05-09 PROCEDURE — 99214 OFFICE O/P EST MOD 30 MIN: CPT | Performed by: INTERNAL MEDICINE

## 2025-05-09 PROCEDURE — 93000 ELECTROCARDIOGRAM COMPLETE: CPT | Performed by: INTERNAL MEDICINE

## 2025-05-09 PROCEDURE — 3080F DIAST BP >= 90 MM HG: CPT | Performed by: INTERNAL MEDICINE

## 2025-05-09 RX ORDER — METOPROLOL SUCCINATE 25 MG/1
25 TABLET, EXTENDED RELEASE ORAL DAILY
Qty: 90 TABLET | Refills: 3 | Status: SHIPPED | OUTPATIENT
Start: 2025-05-09

## 2025-05-09 RX ORDER — VALSARTAN AND HYDROCHLOROTHIAZIDE 320; 12.5 MG/1; MG/1
TABLET, FILM COATED ORAL
Qty: 90 TABLET | Refills: 3 | Status: SHIPPED | OUTPATIENT
Start: 2025-05-09

## 2025-05-09 RX ORDER — AMLODIPINE BESYLATE 5 MG/1
TABLET ORAL
Qty: 135 TABLET | Refills: 3 | Status: SHIPPED | OUTPATIENT
Start: 2025-05-09

## 2025-05-09 ASSESSMENT — ENCOUNTER SYMPTOMS
BACK PAIN: 0
ABDOMINAL DISTENTION: 0
EYE DISCHARGE: 0
VOMITING: 0
COUGH: 0
WHEEZING: 0
BLOOD IN STOOL: 0
SHORTNESS OF BREATH: 0
DIARRHEA: 0
CONSTIPATION: 0

## 2025-05-09 NOTE — PROGRESS NOTES
[facility]  Cardiology Office Note  [address], [place]  [address]  Phone: [phone]  Fax: [phone]                            Date:  5/9/2025  Patient: Odalys Martinez  Age:  50 y.o., 1975    Referral: No ref. provider found      PROBLEM LIST:    Patient Active Problem List    Diagnosis Date Noted    Left arm pain 03/16/2023     Priority: Medium    EKG, abnormal 03/16/2023     Priority: Medium    Chest pressure 03/16/2023     Priority: Medium    Sleep related hypoxia 11/22/2022     Priority: Medium    Paradoxical insomnia 05/23/2023     Priority: Low    Anxiety 05/23/2023     Priority: Low    JUANCHO (obstructive sleep apnea) 03/02/2022     Priority: Low    Morbid obesity (HCC) 03/02/2022     Priority: Low    Hypersomnia 03/02/2022     Priority: Low    Essential hypertension 11/24/2017     Priority: Low    Pure hypercholesterolemia 11/24/2017     Priority: Low     1.  Hypertension.  2.  Atypical chest pain likely related to anxiety with reported abnormal stress nuclear study with good effort tolerance (likely artifactual), normal LV function.  3.  Anxiety disorder.  4.  JUANCHO on CPAP/obesity        PRESENTATION: Odalys Martinez is a 50 y.o. year old female presents for follow-up evaluation.  She has been doing quite well with no significant issues since her last visit.  She left her job and is now working from home and this is much less stressful.  Reports blood pressures at home around 130/90 mmHg.  Minimal leg swelling if she is on her feet for a long time.    REVIEW OF SYSTEMS:  Review of Systems   Constitutional:  Negative for activity change, fatigue and fever.   HENT:  Negative for ear pain, hearing loss and tinnitus.    Eyes:  Negative for discharge and visual disturbance.   Respiratory:  Negative for cough, shortness of breath and wheezing.    Cardiovascular:  Negative for chest pain, palpitations and leg swelling.   Gastrointestinal:  Negative for abdominal

## (undated) DEVICE — ENDO KIT,LOURDES HOSPITAL: Brand: MEDLINE INDUSTRIES, INC.

## (undated) DEVICE — ELECTRD BALL LLETZ 5MM 13CM STRL

## (undated) DEVICE — CANNULA NSL AD L7FT DIV O2 CO2 W/ M LUERLOCK TRMPT CONN

## (undated) DEVICE — Device

## (undated) DEVICE — SINGLE PORT MANIFOLD: Brand: NEPTUNE 2

## (undated) DEVICE — PK TURNOVER RM ADV

## (undated) DEVICE — PAD MAJOR LITHOTOMY: Brand: MEDLINE INDUSTRIES, INC.

## (undated) DEVICE — SYR CONTRL LUERLOK 10CC

## (undated) DEVICE — GLV SURG SENSICARE W/ALOE PF LF 6.5 STRL

## (undated) DEVICE — STERILE RAYON TIPPED OB/GYN APPLICATORS: Brand: PURITAN

## (undated) DEVICE — ST TBG DSE 6FT

## (undated) DEVICE — GLV SURG SENSICARE W/ALOE PF LF SZ6 STRL

## (undated) DEVICE — NDL HYPO PRECISIONGLIDE REG 22G 1 1/2

## (undated) DEVICE — TRY SKINPREP WET CHG 4PCT SPNG HIB